# Patient Record
Sex: FEMALE | Race: WHITE | HISPANIC OR LATINO | Employment: PART TIME | URBAN - METROPOLITAN AREA
[De-identification: names, ages, dates, MRNs, and addresses within clinical notes are randomized per-mention and may not be internally consistent; named-entity substitution may affect disease eponyms.]

---

## 2018-07-26 ENCOUNTER — HOSPITAL ENCOUNTER (OUTPATIENT)
Dept: LAB | Facility: MEDICAL CENTER | Age: 63
End: 2018-07-26
Attending: STUDENT IN AN ORGANIZED HEALTH CARE EDUCATION/TRAINING PROGRAM
Payer: COMMERCIAL

## 2018-07-26 ENCOUNTER — OFFICE VISIT (OUTPATIENT)
Dept: INTERNAL MEDICINE | Facility: MEDICAL CENTER | Age: 63
End: 2018-07-26
Payer: COMMERCIAL

## 2018-07-26 VITALS
RESPIRATION RATE: 20 BRPM | SYSTOLIC BLOOD PRESSURE: 124 MMHG | WEIGHT: 167 LBS | HEIGHT: 64 IN | HEART RATE: 89 BPM | OXYGEN SATURATION: 97 % | BODY MASS INDEX: 28.51 KG/M2 | TEMPERATURE: 97.5 F | DIASTOLIC BLOOD PRESSURE: 72 MMHG

## 2018-07-26 DIAGNOSIS — Z00.00 PREVENTATIVE HEALTH CARE: ICD-10-CM

## 2018-07-26 DIAGNOSIS — Z23 ENCOUNTER FOR VACCINATION: ICD-10-CM

## 2018-07-26 DIAGNOSIS — E03.9 ACQUIRED HYPOTHYROIDISM: ICD-10-CM

## 2018-07-26 LAB
CHOLEST SERPL-MCNC: 206 MG/DL (ref 100–199)
ERYTHROCYTE [DISTWIDTH] IN BLOOD BY AUTOMATED COUNT: 46.8 FL (ref 35.9–50)
HCT VFR BLD AUTO: 45.6 % (ref 37–47)
HDLC SERPL-MCNC: 51 MG/DL
HGB BLD-MCNC: 15.4 G/DL (ref 12–16)
LDLC SERPL CALC-MCNC: 131 MG/DL
MCH RBC QN AUTO: 31.2 PG (ref 27–33)
MCHC RBC AUTO-ENTMCNC: 33.8 G/DL (ref 33.6–35)
MCV RBC AUTO: 92.5 FL (ref 81.4–97.8)
PLATELET # BLD AUTO: 184 K/UL (ref 164–446)
PMV BLD AUTO: 11.6 FL (ref 9–12.9)
RBC # BLD AUTO: 4.93 M/UL (ref 4.2–5.4)
TRIGL SERPL-MCNC: 119 MG/DL (ref 0–149)
TSH SERPL DL<=0.005 MIU/L-ACNC: 1.97 UIU/ML (ref 0.38–5.33)
WBC # BLD AUTO: 6.2 K/UL (ref 4.8–10.8)

## 2018-07-26 PROCEDURE — 84443 ASSAY THYROID STIM HORMONE: CPT

## 2018-07-26 PROCEDURE — 85027 COMPLETE CBC AUTOMATED: CPT

## 2018-07-26 PROCEDURE — 36415 COLL VENOUS BLD VENIPUNCTURE: CPT

## 2018-07-26 PROCEDURE — 80061 LIPID PANEL: CPT

## 2018-07-26 PROCEDURE — 99204 OFFICE O/P NEW MOD 45 MIN: CPT | Mod: GC | Performed by: INTERNAL MEDICINE

## 2018-07-26 RX ORDER — LEVOTHYROXINE SODIUM 88 UG/1
88 TABLET ORAL
Qty: 90 TAB | Refills: 3 | Status: SHIPPED | OUTPATIENT
Start: 2018-07-26 | End: 2022-03-09 | Stop reason: SDUPTHER

## 2018-07-26 RX ORDER — LEVOTHYROXINE SODIUM 88 UG/1
88 TABLET ORAL
COMMUNITY
End: 2018-07-26 | Stop reason: SDUPTHER

## 2018-07-26 ASSESSMENT — PATIENT HEALTH QUESTIONNAIRE - PHQ9: CLINICAL INTERPRETATION OF PHQ2 SCORE: 0

## 2018-07-26 ASSESSMENT — PAIN SCALES - GENERAL: PAINLEVEL: NO PAIN

## 2018-07-26 NOTE — LETTER
"[employer]  Gaby Mcdaniels M.D.  [address]  [place] [address]  Fax: [phone]   Authorization for Release/Disclosure of   Protected Health Information   Name: TANIA KIRKLAND : 1955 SSN: [id number]   Address: [address] [place] [address] [place] [address] Phone:    [phone] (home)    I authorize the entity listed below to release/disclose the PHI below to:   [facility]/Gaby Mcdaniels M.D. and Gaby Mcdaniels M.D.   Provider or Entity Name:                                      [facility]   Phone: [phone]      Fax:     Reason for request: continuity of care   Information to be released:    [  ] LAST COLONOSCOPY,  including any PATH REPORT and follow-up  [  ] LAST FIT/COLOGUARD RESULT [  ] LAST DEXA  [  ] LAST MAMMOGRAM  [  ] LAST PAP  [  ] LAST LABS [  ] RETINA EXAM REPORT  [  ] IMMUNIZATION RECORDS  [X] Release all info      [  ] Check here and initial the line next to each item to release ALL health information INCLUDING  _____ Care and treatment for drug and / or alcohol abuse  _____ HIV testing, infection status, or AIDS  _____ Genetic Testing    DATES OF SERVICE OR TIME PERIOD TO BE DISCLOSED: _____________  I understand and acknowledge that:  * This Authorization may be revoked at any time by you in writing, except if your health information has already been used or disclosed.  * Your health information that will be used or disclosed as a result of you signing this authorization could be re-disclosed by the recipient. If this occurs, your re-disclosed health information may no longer be protected by State or Federal laws.  * You may refuse to sign this Authorization. Your refusal will not affect your ability to obtain treatment.  * This Authorization becomes effective upon signing and will  on (date) __________.      If no date is indicated, this Authorization will  one (1) year from the signature date.       Name: Jenn Petersen    Signature:   Date:     7/26/2018       PLEASE FAX REQUESTED RECORDS BACK TO: [phone]

## 2018-07-26 NOTE — PATIENT INSTRUCTIONS
Thank you for seeing me today.    We talked about:    1) Healthcare  - Blood work today    2) Hypothyroidism  - Checking your thyroid hormone today  - I refilled your levothyroxine    See you again in 1 year

## 2018-07-26 NOTE — PROGRESS NOTES
New Patient to Establish    Reason to establish: New patient to establish    CC: Hypothyroidism    HPI: 62yo patient here to establish care.    PMH includes hypothyroidism. She takes 88mcg daily and her last TSH was checked about 9 months ago. No fatigue, hair loss, chest palpitations, constipation.    Finger numbness: She endorses some finger numbness at the tips of her fingers that occurs in the morning for the last year. Attributes this to heavy work building a new deck. No numbness anywhere else in her upper extremities, nonpainful, no cyanosis.    Patient Active Problem List    Diagnosis Date Noted   • Acquired hypothyroidism 07/26/2018   • Preventative health care 07/26/2018       Past Medical History:   Diagnosis Date   • Thyroid disease        Current Outpatient Prescriptions   Medication Sig Dispense Refill   • levothyroxine (SYNTHROID) 88 MCG Tab Take 1 Tab by mouth Every morning on an empty stomach. 90 Tab 3     No current facility-administered medications for this visit.        Allergies as of 07/26/2018   • (Not on File)       Social History     Social History   • Marital status:      Spouse name: N/A   • Number of children: N/A   • Years of education: N/A     Occupational History   • Not on file.     Social History Main Topics   • Smoking status: Never Smoker   • Smokeless tobacco: Never Used   • Alcohol use Yes      Comment: occasionally   • Drug use: No   • Sexual activity: Not on file     Other Topics Concern   • Not on file     Social History Narrative   • No narrative on file       Family History   Problem Relation Age of Onset   • Heart Disease Mother    • Cancer Father    • Hypertension Sister        History reviewed. No pertinent surgical history.    ROS: As per HPI. Additional pertinent symptoms as noted below.  Constitutional:  no fevers/chills, no recent weight loss.   Eyes:  no changes in vision.  ENT:  no hearing loss/no changes in hearing.  No congestion.  No sore  "throat.  Cardiovascular:  no chest pain  No orthopnea, no PND.  Respiratory:  no shortness of breath, no cough, no wheezing.  No sputum production.    GI:  no abdominal pain.  No nausea/vomiting.  No heartburn.  No diarrhea, no constipation.  No blood in stool.  :  no polyuria, no burning on urination.  No hematuria.    MSK:  no myalgias, no back pain, no joint pain.  Neurological: no headache. No dizziness.  No tremors. No changes in sensation.   Psych: no depression, no anxiety.  No SI/HI.     /72   Pulse 89   Temp 36.4 °C (97.5 °F)   Resp 20   Ht 1.626 m (5' 4\")   Wt 75.8 kg (167 lb)   SpO2 97%   Breastfeeding? No   BMI 28.67 kg/m²     Physical Exam  General:  Alert and oriented, No apparent distress.  Eyes: Pupils equal and reactive. No scleral icterus.  Throat: Clear no erythema or exudates noted.  Neck: Supple. No lymphadenopathy noted. Thyroid not enlarged.  Lungs: Clear to auscultation and percussion bilaterally.  Cardiovascular: Regular rate and rhythm. No murmurs, rubs or gallops  Abdomen:  Benign. No rebound or guarding noted.  Extremities: No clubbing, cyanosis, edema.  Skin: Clear. No rash or suspicious skin lesions noted.      Assessment and Plan    1. Preventative health care  - Mammogram 1 year ago  - Colonoscopy 2 years ago  - Pap smear 2 years ago    2. Acquired hypothyroidism  - TSH  - Refill levothyroxine 88mcg    3. Encounter for vaccination  Shingles vaccine recommended    4. Finger numbness  Likely related to muscle fatigue d/t heavy lifting. Advised her to let me know if the symptoms persist despite stopping the yard work.    Followup: Return in about 1 year (around 7/26/2019).    Signed by: Gaby Mcdaniels M.D.    "

## 2019-07-15 ENCOUNTER — OFFICE VISIT (OUTPATIENT)
Dept: OCCUPATIONAL MEDICINE | Facility: CLINIC | Age: 64
End: 2019-07-15

## 2019-07-15 ENCOUNTER — NON-PROVIDER VISIT (OUTPATIENT)
Dept: OCCUPATIONAL MEDICINE | Facility: CLINIC | Age: 64
End: 2019-07-15

## 2019-07-15 ENCOUNTER — HOSPITAL ENCOUNTER (OUTPATIENT)
Facility: MEDICAL CENTER | Age: 64
End: 2019-07-15
Attending: PREVENTIVE MEDICINE
Payer: COMMERCIAL

## 2019-07-15 VITALS
OXYGEN SATURATION: 98 % | WEIGHT: 174 LBS | HEART RATE: 96 BPM | DIASTOLIC BLOOD PRESSURE: 80 MMHG | SYSTOLIC BLOOD PRESSURE: 138 MMHG | TEMPERATURE: 98.2 F | BODY MASS INDEX: 29.71 KG/M2 | HEIGHT: 64 IN

## 2019-07-15 DIAGNOSIS — Z02.1 PRE-EMPLOYMENT HEALTH SCREENING EXAMINATION: ICD-10-CM

## 2019-07-15 DIAGNOSIS — Z02.1 PRE-EMPLOYMENT DRUG SCREENING: ICD-10-CM

## 2019-07-15 LAB
AMP AMPHETAMINE: NORMAL
COC COCAINE: NORMAL
INT CON NEG: NORMAL
INT CON POS: NORMAL
MET METHAMPHETAMINES: NORMAL
OPI OPIATES: NORMAL
PCP PHENCYCLIDINE: NORMAL
POC DRUG COMMENT 753798-OCCUPATIONAL HEALTH: NORMAL
THC: NORMAL

## 2019-07-15 PROCEDURE — 8915 PR COMPREHENSIVE PHYSICAL: Performed by: PREVENTIVE MEDICINE

## 2019-07-15 PROCEDURE — 80305 DRUG TEST PRSMV DIR OPT OBS: CPT | Performed by: PREVENTIVE MEDICINE

## 2019-07-15 PROCEDURE — 86480 TB TEST CELL IMMUN MEASURE: CPT | Performed by: PREVENTIVE MEDICINE

## 2019-07-17 LAB
GAMMA INTERFERON BACKGROUND BLD IA-ACNC: 0.06 IU/ML
M TB IFN-G BLD-IMP: NEGATIVE
M TB IFN-G CD4+ BCKGRND COR BLD-ACNC: -0.02 IU/ML
MITOGEN IGNF BCKGRD COR BLD-ACNC: >10 IU/ML
QFT TB2 - NIL TBQ2: -0.01 IU/ML

## 2020-02-19 ENCOUNTER — HOSPITAL ENCOUNTER (OUTPATIENT)
Dept: RADIOLOGY | Facility: MEDICAL CENTER | Age: 65
End: 2020-02-19
Attending: STUDENT IN AN ORGANIZED HEALTH CARE EDUCATION/TRAINING PROGRAM
Payer: COMMERCIAL

## 2020-02-19 DIAGNOSIS — Z12.31 OTHER SCREENING MAMMOGRAM: ICD-10-CM

## 2020-02-19 PROCEDURE — 77067 SCR MAMMO BI INCL CAD: CPT

## 2020-02-25 ENCOUNTER — HOSPITAL ENCOUNTER (OUTPATIENT)
Dept: RADIOLOGY | Facility: MEDICAL CENTER | Age: 65
End: 2020-02-25
Payer: COMMERCIAL

## 2021-03-03 DIAGNOSIS — Z23 NEED FOR VACCINATION: ICD-10-CM

## 2022-03-09 ENCOUNTER — OFFICE VISIT (OUTPATIENT)
Dept: INTERNAL MEDICINE | Facility: OTHER | Age: 67
End: 2022-03-09
Payer: MEDICARE

## 2022-03-09 VITALS
DIASTOLIC BLOOD PRESSURE: 78 MMHG | HEIGHT: 63 IN | BODY MASS INDEX: 29.7 KG/M2 | OXYGEN SATURATION: 99 % | WEIGHT: 167.6 LBS | SYSTOLIC BLOOD PRESSURE: 146 MMHG | TEMPERATURE: 97.2 F | HEART RATE: 60 BPM

## 2022-03-09 DIAGNOSIS — Z12.12 SCREENING FOR COLORECTAL CANCER: ICD-10-CM

## 2022-03-09 DIAGNOSIS — E03.9 ACQUIRED HYPOTHYROIDISM: ICD-10-CM

## 2022-03-09 DIAGNOSIS — Z00.00 MEDICARE ANNUAL WELLNESS VISIT, INITIAL: Primary | ICD-10-CM

## 2022-03-09 DIAGNOSIS — Z13.820 ENCOUNTER FOR OSTEOPOROSIS SCREENING IN ASYMPTOMATIC POSTMENOPAUSAL PATIENT: ICD-10-CM

## 2022-03-09 DIAGNOSIS — Z78.0 ENCOUNTER FOR OSTEOPOROSIS SCREENING IN ASYMPTOMATIC POSTMENOPAUSAL PATIENT: ICD-10-CM

## 2022-03-09 DIAGNOSIS — Z91.81 RISK FOR FALLS: ICD-10-CM

## 2022-03-09 DIAGNOSIS — Z12.11 SCREENING FOR COLORECTAL CANCER: ICD-10-CM

## 2022-03-09 DIAGNOSIS — E78.5 HYPERLIPIDEMIA, UNSPECIFIED HYPERLIPIDEMIA TYPE: ICD-10-CM

## 2022-03-09 DIAGNOSIS — Z12.31 ENCOUNTER FOR SCREENING MAMMOGRAM FOR BREAST CANCER: ICD-10-CM

## 2022-03-09 PROCEDURE — G0439 PPPS, SUBSEQ VISIT: HCPCS | Mod: GE

## 2022-03-09 RX ORDER — LEVOTHYROXINE SODIUM 88 UG/1
88 TABLET ORAL
Qty: 90 TABLET | Refills: 3 | Status: SHIPPED | OUTPATIENT
Start: 2022-03-09 | End: 2023-03-21

## 2022-03-09 SDOH — HEALTH STABILITY: MENTAL HEALTH
STRESS IS WHEN SOMEONE FEELS TENSE, NERVOUS, ANXIOUS, OR CAN'T SLEEP AT NIGHT BECAUSE THEIR MIND IS TROUBLED. HOW STRESSED ARE YOU?: ONLY A LITTLE

## 2022-03-09 SDOH — ECONOMIC STABILITY: HOUSING INSECURITY
IN THE LAST 12 MONTHS, WAS THERE A TIME WHEN YOU DID NOT HAVE A STEADY PLACE TO SLEEP OR SLEPT IN A SHELTER (INCLUDING NOW)?: NO

## 2022-03-09 SDOH — ECONOMIC STABILITY: TRANSPORTATION INSECURITY
IN THE PAST 12 MONTHS, HAS THE LACK OF TRANSPORTATION KEPT YOU FROM MEDICAL APPOINTMENTS OR FROM GETTING MEDICATIONS?: NO

## 2022-03-09 SDOH — ECONOMIC STABILITY: FOOD INSECURITY: WITHIN THE PAST 12 MONTHS, YOU WORRIED THAT YOUR FOOD WOULD RUN OUT BEFORE YOU GOT MONEY TO BUY MORE.: NEVER TRUE

## 2022-03-09 SDOH — ECONOMIC STABILITY: HOUSING INSECURITY: IN THE LAST 12 MONTHS, HOW MANY PLACES HAVE YOU LIVED?: 1

## 2022-03-09 SDOH — ECONOMIC STABILITY: FOOD INSECURITY: WITHIN THE PAST 12 MONTHS, THE FOOD YOU BOUGHT JUST DIDN'T LAST AND YOU DIDN'T HAVE MONEY TO GET MORE.: NEVER TRUE

## 2022-03-09 SDOH — ECONOMIC STABILITY: TRANSPORTATION INSECURITY
IN THE PAST 12 MONTHS, HAS LACK OF TRANSPORTATION KEPT YOU FROM MEETINGS, WORK, OR FROM GETTING THINGS NEEDED FOR DAILY LIVING?: NO

## 2022-03-09 SDOH — ECONOMIC STABILITY: INCOME INSECURITY: HOW HARD IS IT FOR YOU TO PAY FOR THE VERY BASICS LIKE FOOD, HOUSING, MEDICAL CARE, AND HEATING?: NOT VERY HARD

## 2022-03-09 SDOH — ECONOMIC STABILITY: INCOME INSECURITY: IN THE LAST 12 MONTHS, WAS THERE A TIME WHEN YOU WERE NOT ABLE TO PAY THE MORTGAGE OR RENT ON TIME?: NO

## 2022-03-09 SDOH — HEALTH STABILITY: PHYSICAL HEALTH: ON AVERAGE, HOW MANY MINUTES DO YOU ENGAGE IN EXERCISE AT THIS LEVEL?: 20 MIN

## 2022-03-09 SDOH — ECONOMIC STABILITY: TRANSPORTATION INSECURITY
IN THE PAST 12 MONTHS, HAS LACK OF RELIABLE TRANSPORTATION KEPT YOU FROM MEDICAL APPOINTMENTS, MEETINGS, WORK OR FROM GETTING THINGS NEEDED FOR DAILY LIVING?: NO

## 2022-03-09 SDOH — HEALTH STABILITY: PHYSICAL HEALTH: ON AVERAGE, HOW MANY DAYS PER WEEK DO YOU ENGAGE IN MODERATE TO STRENUOUS EXERCISE (LIKE A BRISK WALK)?: 7 DAYS

## 2022-03-09 ASSESSMENT — ACTIVITIES OF DAILY LIVING (ADL): BATHING_REQUIRES_ASSISTANCE: 0

## 2022-03-09 ASSESSMENT — SOCIAL DETERMINANTS OF HEALTH (SDOH)
HOW HARD IS IT FOR YOU TO PAY FOR THE VERY BASICS LIKE FOOD, HOUSING, MEDICAL CARE, AND HEATING?: NOT VERY HARD
HOW OFTEN DO YOU ATTENT MEETINGS OF THE CLUB OR ORGANIZATION YOU BELONG TO?: NEVER
IN A TYPICAL WEEK, HOW MANY TIMES DO YOU TALK ON THE PHONE WITH FAMILY, FRIENDS, OR NEIGHBORS?: MORE THAN THREE TIMES A WEEK
WITHIN THE PAST 12 MONTHS, YOU WORRIED THAT YOUR FOOD WOULD RUN OUT BEFORE YOU GOT THE MONEY TO BUY MORE: NEVER TRUE
HOW OFTEN DO YOU ATTEND CHURCH OR RELIGIOUS SERVICES?: NEVER
HOW OFTEN DO YOU GET TOGETHER WITH FRIENDS OR RELATIVES?: PATIENT DECLINED
HOW MANY DRINKS CONTAINING ALCOHOL DO YOU HAVE ON A TYPICAL DAY WHEN YOU ARE DRINKING: 1 OR 2
HOW OFTEN DO YOU ATTENT MEETINGS OF THE CLUB OR ORGANIZATION YOU BELONG TO?: NEVER
HOW OFTEN DO YOU GET TOGETHER WITH FRIENDS OR RELATIVES?: PATIENT DECLINED
DO YOU BELONG TO ANY CLUBS OR ORGANIZATIONS SUCH AS CHURCH GROUPS UNIONS, FRATERNAL OR ATHLETIC GROUPS, OR SCHOOL GROUPS?: NO
IN A TYPICAL WEEK, HOW MANY TIMES DO YOU TALK ON THE PHONE WITH FAMILY, FRIENDS, OR NEIGHBORS?: MORE THAN THREE TIMES A WEEK
HOW OFTEN DO YOU ATTEND CHURCH OR RELIGIOUS SERVICES?: NEVER
DO YOU BELONG TO ANY CLUBS OR ORGANIZATIONS SUCH AS CHURCH GROUPS UNIONS, FRATERNAL OR ATHLETIC GROUPS, OR SCHOOL GROUPS?: NO
HOW OFTEN DO YOU HAVE A DRINK CONTAINING ALCOHOL: 2-4 TIMES A MONTH
HOW OFTEN DO YOU HAVE SIX OR MORE DRINKS ON ONE OCCASION: NEVER

## 2022-03-09 ASSESSMENT — LIFESTYLE VARIABLES
HOW OFTEN DO YOU HAVE SIX OR MORE DRINKS ON ONE OCCASION: NEVER
HOW OFTEN DO YOU HAVE A DRINK CONTAINING ALCOHOL: 2-4 TIMES A MONTH
HOW MANY STANDARD DRINKS CONTAINING ALCOHOL DO YOU HAVE ON A TYPICAL DAY: 1 OR 2

## 2022-03-09 ASSESSMENT — ENCOUNTER SYMPTOMS: GENERAL WELL-BEING: GOOD

## 2022-03-09 ASSESSMENT — PATIENT HEALTH QUESTIONNAIRE - PHQ9: CLINICAL INTERPRETATION OF PHQ2 SCORE: 0

## 2022-03-09 NOTE — PATIENT INSTRUCTIONS
It was great seeing you today Jenn!    Today we saw you for your Medicare wellness visit.    It seems like overall things are going very well for you.    We addressed many healthcare maintenance items and have placed the appropriate referrals and orders for these.    For your mammogram and bone density scan you will need to call [facility] imaging or other imaging service of your choice to set up a schedule to have these done.    For your colonoscopy, referral to a GI doctor has been sent.  You should receive notification that the referral has been processed within 2 weeks, if you have not heard from a GI doctor or not received a message in my chart within this time please reach out to our office and we will make sure the referral has been processed.    For your labs you can have these done at your convenience at [facility] as we discussed. You will just need to call and schedule.    We also discussed your immunizations.  You are due for the following:    Tdap  Shingles  Pneumovax    You can go to the pharmacy of your choice to have these done.    If everything comes back nonconcerning we will plan to follow-up in 1 year for another wellness visit.    Otherwise we will notify you of any abnormal results and will schedule follow-up as needed.

## 2022-03-09 NOTE — PROGRESS NOTES
Chief Complaint   Patient presents with   • Medicare Annual Wellness     Yearly physical medicare         HPI:  Jenn is a 67 y.o. here for Medicare Annual Wellness Visit    She reports that she is generally healthy and has no acute concerns.       Patient Active Problem List    Diagnosis Date Noted   • Risk for falls 03/09/2022   • Medicare annual wellness visit, initial 07/26/2018   • History of colonic polyps 03/11/2014   • Hyperlipidemia 12/15/2011   • Hypothyroidism 04/09/2009       Current Outpatient Medications   Medication Sig Dispense Refill   • levothyroxine (SYNTHROID) 88 MCG Tab Take 1 Tablet by mouth every morning on an empty stomach. 90 Tablet 3     No current facility-administered medications for this visit.        Patient is taking medications as noted in medication list.  Current supplements as per medication list.     Allergies: Patient has no known allergies.    Current social contact/activities: volunteer food bank-wood work    Is patient current with immunizations? Yes.    She  reports that she has never smoked. She has never used smokeless tobacco. She reports current alcohol use. She reports that she does not use drugs.  Counseling given: Not Answered        DPA/Advanced directive: Patient has Living Will, but it is not on file. Instructed to bring in a copy to scan into their chart.    ROS:    Gait: Uses no assistive device   Ostomy: No   Other tubes: No   Amputations: No   Chronic oxygen use No   Last eye exam 08/12/2021   Wears hearing aids: No   : Denies any urinary leakage during the last 6 months      Screening:        Depression Screening  Little interest or pleasure in doing things?  0 - not at all  Feeling down, depressed, or hopeless? 0 - not at all  Patient Health Questionnaire Score: 0    If depressive symptoms identified deferred to follow up visit unless specifically addressed in assessment and plan.    Interpretation of PHQ-9 Total Score   Score Severity   1-4 No  Depression   5-9 Mild Depression   10-14 Moderate Depression   15-19 Moderately Severe Depression   20-27 Severe Depression    Screening for Cognitive Impairment  Three Minute Recall (daughter, heaven, mountain)  3/3    Jose Martin clock face with all 12 numbers and set the hands to show 10 past 11.  Yes    If cognitive concerns identified, deferred for follow up unless specifically addressed in assessment and plan.    Fall Risk Assessment  Has the patient had two or more falls in the last year or any fall with injury in the last year?  Yes- Only had one fall slipping on ice.  If fall risk identified, deferred for follow up unless specifically addressed in assessment and plan.    Safety Assessment  Throw rugs on floor.  Yes  Handrails on all stairs.  No  Good lighting in all hallways.  Yes  Difficulty hearing.  No  Patient counseled about all safety risks that were identified.    Functional Assessment ADLs  Are there any barriers preventing you from cooking for yourself or meeting nutritional needs?  No.    Are there any barriers preventing you from driving safely or obtaining transportation?  No.    Are there any barriers preventing you from using a telephone or calling for help?  No.    Are there any barriers preventing you from shopping?  No.    Are there any barriers preventing you from taking care of your own finances?  No.    Are there any barriers preventing you from managing your medications?  No.    Are there any barriers preventing you from showering, bathing or dressing yourself?  No.    Are you currently engaging in any exercise or physical activity?  No.     What is your perception of your health?  Good.    Health Maintenance Summary          Overdue - HEPATITIS C SCREENING (Once) Overdue - never done    No completion history exists for this topic.          Overdue - PAP SMEAR (Every 3 Years) Overdue - never done    No completion history exists for this topic.          Ordered - COLORECTAL CANCER SCREENING  (COLONOSCOPY - Every 10 Years) Ordered on 3/9/2022    No completion history exists for this topic.          Overdue - IMM ZOSTER VACCINES (1 of 2) Overdue - never done    No completion history exists for this topic.          Overdue - IMM DTaP/Tdap/Td Vaccine (2 - Td or Tdap) Overdue since 10/15/2020    10/15/2010  Imm Admin: Tdap Vaccine    05/10/2004  Imm Admin: TD Vaccine    01/01/1992  Imm Admin: TD Vaccine          Ordered - BONE DENSITY (Every 5 Years) Ordered on 3/9/2022    No completion history exists for this topic.          Overdue - IMM PNEUMOCOCCAL VACCINE: 65+ Years (1 of 1 - PPSV23) Overdue - never done    No completion history exists for this topic.          Ordered - MAMMOGRAM (Yearly) Ordered on 3/9/2022    02/19/2020  MA-SCREENING MAMMO BILAT W/TOMOSYNTHESIS W/CAD          IMM INFLUENZA (Series Information) Completed    10/09/2021  Imm Admin: Influenza Vaccine Quad Inj (Pf)    01/25/2016  Imm Admin: Influenza Vaccine Quad Inj (Pf)    10/17/2013  Imm Admin: Influenza, Unspecified - HISTORICAL DATA    09/20/2011  Imm Admin: Influenza Seasonal Injectable - Historical Data          COVID-19 Vaccine (Series Information) Completed    11/22/2021  Imm Admin: Moderna SARS-CoV-2 Vaccine    04/06/2021  Imm Admin: Moderna SARS-CoV-2 Vaccine    03/09/2021  Imm Admin: Moderna SARS-CoV-2 Vaccine          IMM HEP B VACCINE (Series Information) Aged Out    No completion history exists for this topic.          IMM MENINGOCOCCAL VACCINE (MCV4) (Series Information) Aged Out    No completion history exists for this topic.                Patient Care Team:  Allie Fowler M.D. as PCP - General (Internal Medicine)    Social History     Tobacco Use   • Smoking status: Never Smoker   • Smokeless tobacco: Never Used   Substance Use Topics   • Alcohol use: Yes     Comment: occasionally   • Drug use: No     Family History   Problem Relation Age of Onset   • Heart Disease Mother    • Cancer Father    • Hypertension Sister   "  • Breast Cancer Maternal Aunt      She  has a past medical history of Thyroid disease.   No past surgical history on file.        Exam:     /78 (BP Location: Left arm, Patient Position: Sitting, BP Cuff Size: Large adult long)   Pulse 60   Temp 36.2 °C (97.2 °F) (Temporal)   Ht 1.595 m (5' 2.8\")   Wt 76 kg (167 lb 9.6 oz)   SpO2 99%  Body mass index is 29.88 kg/m².    Hearing good.    Dentition good  Alert, oriented in no acute distress.  Eye contact is good, speech goal directed, affect calm      Assessment and Plan. The following treatment and monitoring plan is recommended:    1. Medicare annual wellness visit, initial     2. Acquired hypothyroidism  TSH+FREE T4    Comp Metabolic Panel    levothyroxine (SYNTHROID) 88 MCG Tab   3. Risk for falls  Patient identified as fall risk.  Appropriate orders and counseling given.   4. Encounter for screening mammogram for breast cancer  MA-SCREENING MAMMO BILAT W/TOMOSYNTHESIS W/CAD   5. Screening for colorectal cancer  Referral to GI for Colonoscopy    CBC WITH DIFFERENTIAL   6. Encounter for osteoporosis screening in asymptomatic postmenopausal patient  VITAMIN D,25 HYDROXY    DS-BONE DENSITY STUDY (DEXA)   7. Hyperlipidemia, unspecified hyperlipidemia type  Lipid Profile         Services suggested: No services needed at this time  Health Care Screening recommendations as per orders if indicated.  Referrals offered: PT/OT/Nutrition counseling/Behavioral Health/Smoking cessation as per orders if indicated.    Discussion today about general wellness and lifestyle habits:    · Prevent falls and reduce trip hazards; Cautioned about securing or removing rugs.  · Have a working fire alarm and carbon monoxide detector;   · Engage in regular physical activity and social activities       Follow-up: No follow-ups on file.  "

## 2022-03-28 ENCOUNTER — HOSPITAL ENCOUNTER (OUTPATIENT)
Dept: RADIOLOGY | Facility: MEDICAL CENTER | Age: 67
End: 2022-03-28
Payer: MEDICARE

## 2022-03-28 DIAGNOSIS — Z78.0 ENCOUNTER FOR OSTEOPOROSIS SCREENING IN ASYMPTOMATIC POSTMENOPAUSAL PATIENT: ICD-10-CM

## 2022-03-28 DIAGNOSIS — Z13.820 ENCOUNTER FOR OSTEOPOROSIS SCREENING IN ASYMPTOMATIC POSTMENOPAUSAL PATIENT: ICD-10-CM

## 2022-03-28 DIAGNOSIS — Z12.31 ENCOUNTER FOR SCREENING MAMMOGRAM FOR BREAST CANCER: ICD-10-CM

## 2022-03-28 PROCEDURE — 77063 BREAST TOMOSYNTHESIS BI: CPT

## 2022-03-28 PROCEDURE — 77080 DXA BONE DENSITY AXIAL: CPT

## 2022-11-07 ENCOUNTER — PATIENT MESSAGE (OUTPATIENT)
Dept: HEALTH INFORMATION MANAGEMENT | Facility: OTHER | Age: 67
End: 2022-11-07

## 2023-03-19 DIAGNOSIS — E03.9 ACQUIRED HYPOTHYROIDISM: ICD-10-CM

## 2023-03-21 RX ORDER — LEVOTHYROXINE SODIUM 88 UG/1
TABLET ORAL
Qty: 90 TABLET | Refills: 0 | Status: SHIPPED | OUTPATIENT
Start: 2023-03-21 | End: 2023-07-10 | Stop reason: SDUPTHER

## 2023-07-06 DIAGNOSIS — E03.9 ACQUIRED HYPOTHYROIDISM: ICD-10-CM

## 2023-07-10 ENCOUNTER — OFFICE VISIT (OUTPATIENT)
Dept: INTERNAL MEDICINE | Facility: OTHER | Age: 68
End: 2023-07-10
Payer: MEDICARE

## 2023-07-10 VITALS
TEMPERATURE: 97.8 F | DIASTOLIC BLOOD PRESSURE: 80 MMHG | OXYGEN SATURATION: 96 % | HEART RATE: 62 BPM | WEIGHT: 170.8 LBS | SYSTOLIC BLOOD PRESSURE: 125 MMHG | BODY MASS INDEX: 30.26 KG/M2 | HEIGHT: 63 IN

## 2023-07-10 DIAGNOSIS — E03.9 ACQUIRED HYPOTHYROIDISM: ICD-10-CM

## 2023-07-10 DIAGNOSIS — E66.9 OBESITY (BMI 30-39.9): ICD-10-CM

## 2023-07-10 DIAGNOSIS — E78.5 HYPERLIPIDEMIA, UNSPECIFIED HYPERLIPIDEMIA TYPE: ICD-10-CM

## 2023-07-10 DIAGNOSIS — Z00.00 ENCOUNTER FOR PREVENTIVE CARE: ICD-10-CM

## 2023-07-10 PROCEDURE — 3074F SYST BP LT 130 MM HG: CPT

## 2023-07-10 PROCEDURE — 99214 OFFICE O/P EST MOD 30 MIN: CPT | Mod: GC

## 2023-07-10 PROCEDURE — 3079F DIAST BP 80-89 MM HG: CPT

## 2023-07-10 RX ORDER — LEVOTHYROXINE SODIUM 88 UG/1
88 TABLET ORAL
Qty: 30 TABLET | Refills: 1 | Status: SHIPPED | OUTPATIENT
Start: 2023-07-10 | End: 2023-09-07 | Stop reason: SDUPTHER

## 2023-07-10 ASSESSMENT — ENCOUNTER SYMPTOMS
RESPIRATORY NEGATIVE: 1
MUSCULOSKELETAL NEGATIVE: 1
CONSTITUTIONAL NEGATIVE: 1
PALPITATIONS: 0
NEUROLOGICAL NEGATIVE: 1
CARDIOVASCULAR NEGATIVE: 1
PSYCHIATRIC NEGATIVE: 1
GASTROINTESTINAL NEGATIVE: 1

## 2023-07-10 ASSESSMENT — PATIENT HEALTH QUESTIONNAIRE - PHQ9: CLINICAL INTERPRETATION OF PHQ2 SCORE: 0

## 2023-07-10 NOTE — PATIENT INSTRUCTIONS
Please do blood work in fasting, please schedule follow up in one month with Dr. Fowler  We refilled your thyroid medication

## 2023-07-10 NOTE — PROGRESS NOTES
"Subjective     Jenn Petersen is a 68 y.o. female who presents with Follow-Up and Medication Refill (levothyroxine)            Patient comes in with no acute complaints and concerns other than refill of medications.  Patient states that she has been compliant on Levothyroxine 88 mcg for years , with no recent dose changes.  Reviewed with patient and most recent TSH on file was 2018.  Aymptomatic, no hair or skin changes    Blood pressure elevation-This was not one of patient's concern during visit however was also addressed.  This visit, patient's blood pressure at first was noted to be 162/94, repeat blood pressure 125/80 . States that she normally does not have that high of blood pressure, states that she had only minimal amount of sleep last night.She is a non smoker, non alcoholic beverage drinker.  Patient is asymptomatic, denies headache, dizziness, denies visual changes.  Blood pressure during previous visit on 2022 was noted to be 144/78.           Review of Systems   Constitutional: Negative.    HENT: Negative.     Respiratory: Negative.     Cardiovascular: Negative.  Negative for palpitations.   Gastrointestinal: Negative.    Genitourinary: Negative.    Musculoskeletal: Negative.    Skin: Negative.    Neurological: Negative.    Endo/Heme/Allergies: Negative.    Psychiatric/Behavioral: Negative.                Objective     /80   Pulse 62   Temp 36.6 °C (97.8 °F) (Temporal)   Ht 1.6 m (5' 3\")   Wt 77.5 kg (170 lb 12.8 oz)   SpO2 96%   BMI 30.26 kg/m²      Physical Exam  Constitutional:       Appearance: Normal appearance.   HENT:      Head: Normocephalic and atraumatic.      Nose: Nose normal.      Mouth/Throat:      Mouth: Mucous membranes are moist.   Eyes:      Extraocular Movements: Extraocular movements intact.   Cardiovascular:      Rate and Rhythm: Normal rate and regular rhythm.   Pulmonary:      Effort: Pulmonary effort is normal.   Abdominal:      Palpations: Abdomen is soft. "   Musculoskeletal:         General: Normal range of motion.      Cervical back: Normal range of motion.   Skin:     General: Skin is warm.      Capillary Refill: Capillary refill takes less than 2 seconds.   Neurological:      General: No focal deficit present.      Mental Status: She is oriented to person, place, and time.   Psychiatric:         Mood and Affect: Mood normal.                             Assessment & Plan            Blood pressure elevation, with no established hypertension  -Advised patient to continue checking her blood pressure at home and bring BP log next visit with her PCP Dr. Fowler  -Advised to continue lifestyle modifications  -CMP, lipid panel pending      2. Acquired hypothyroidism  -Continue with current dose levothyroxine (SYNTHROID) 88 MCG Tab for now, pending repeat TSH WITH REFLEX TO FT4  -prescription refilled    3 . Obesity (BMI 30-39.9)  -Advised lifestyle modifications, regular healthy diet and regular exercise as above  -Pending lipid panel, a1c    4. history of diabetes in the family( mother)  -hemoglobin A1c ordered    5.Encounter for preventive care  - HEP C VIRUS ANTIBODY; Future  - Referral to GI for Colonoscopy  -For more focused preventive care discussion during patient's next visit with PCP            #Follow-up in 1 month with Dr. Fowler

## 2023-07-11 RX ORDER — LEVOTHYROXINE SODIUM 88 UG/1
TABLET ORAL
Qty: 90 TABLET | Refills: 0 | OUTPATIENT
Start: 2023-07-11

## 2023-08-11 ENCOUNTER — TELEPHONE (OUTPATIENT)
Dept: INTERNAL MEDICINE | Facility: OTHER | Age: 68
End: 2023-08-11
Payer: MEDICARE

## 2023-08-11 NOTE — TELEPHONE ENCOUNTER
VOICEMAIL  1. Caller Name: Jenn Petersen  Call Back Number: [phone]    2. Message: Patient wants to schedule appt with Dr. Fowler for august 30 between 10-12 am. Please call patient to schedule.     3. Patient approves office to leave a detailed voicemail/MyChart message: N\A

## 2023-08-14 NOTE — TELEPHONE ENCOUNTER
Patient has an appt to re-estab care at another [facility] location.  Called and asked patient if she still wants to book with Dr Montero?

## 2023-09-01 ENCOUNTER — HOSPITAL ENCOUNTER (OUTPATIENT)
Dept: LAB | Facility: MEDICAL CENTER | Age: 68
End: 2023-09-01
Payer: MEDICARE

## 2023-09-01 DIAGNOSIS — E03.9 ACQUIRED HYPOTHYROIDISM: ICD-10-CM

## 2023-09-01 DIAGNOSIS — Z00.00 ENCOUNTER FOR PREVENTIVE CARE: ICD-10-CM

## 2023-09-01 DIAGNOSIS — E78.5 HYPERLIPIDEMIA, UNSPECIFIED HYPERLIPIDEMIA TYPE: ICD-10-CM

## 2023-09-01 DIAGNOSIS — E66.9 OBESITY (BMI 30-39.9): ICD-10-CM

## 2023-09-01 LAB
ALBUMIN SERPL BCP-MCNC: 4.3 G/DL (ref 3.2–4.9)
ALBUMIN/GLOB SERPL: 1.3 G/DL
ALP SERPL-CCNC: 98 U/L (ref 30–99)
ALT SERPL-CCNC: 25 U/L (ref 2–50)
ANION GAP SERPL CALC-SCNC: 10 MMOL/L (ref 7–16)
AST SERPL-CCNC: 27 U/L (ref 12–45)
BILIRUB SERPL-MCNC: 0.5 MG/DL (ref 0.1–1.5)
BUN SERPL-MCNC: 12 MG/DL (ref 8–22)
CALCIUM ALBUM COR SERPL-MCNC: 9.4 MG/DL (ref 8.5–10.5)
CALCIUM SERPL-MCNC: 9.6 MG/DL (ref 8.5–10.5)
CHLORIDE SERPL-SCNC: 103 MMOL/L (ref 96–112)
CHOLEST SERPL-MCNC: 249 MG/DL (ref 100–199)
CO2 SERPL-SCNC: 24 MMOL/L (ref 20–33)
CREAT SERPL-MCNC: 0.76 MG/DL (ref 0.5–1.4)
EST. AVERAGE GLUCOSE BLD GHB EST-MCNC: 120 MG/DL
FASTING STATUS PATIENT QL REPORTED: NORMAL
GFR SERPLBLD CREATININE-BSD FMLA CKD-EPI: 86 ML/MIN/1.73 M 2
GLOBULIN SER CALC-MCNC: 3.3 G/DL (ref 1.9–3.5)
GLUCOSE SERPL-MCNC: 89 MG/DL (ref 65–99)
HBA1C MFR BLD: 5.8 % (ref 4–5.6)
HCV AB SER QL: NORMAL
HDLC SERPL-MCNC: 53 MG/DL
LDLC SERPL CALC-MCNC: 173 MG/DL
POTASSIUM SERPL-SCNC: 4.4 MMOL/L (ref 3.6–5.5)
PROT SERPL-MCNC: 7.6 G/DL (ref 6–8.2)
SODIUM SERPL-SCNC: 137 MMOL/L (ref 135–145)
TRIGL SERPL-MCNC: 117 MG/DL (ref 0–149)
TSH SERPL DL<=0.005 MIU/L-ACNC: 2.13 UIU/ML (ref 0.38–5.33)

## 2023-09-01 PROCEDURE — 80053 COMPREHEN METABOLIC PANEL: CPT

## 2023-09-01 PROCEDURE — 86803 HEPATITIS C AB TEST: CPT

## 2023-09-01 PROCEDURE — 84443 ASSAY THYROID STIM HORMONE: CPT

## 2023-09-01 PROCEDURE — 80061 LIPID PANEL: CPT

## 2023-09-01 PROCEDURE — 36415 COLL VENOUS BLD VENIPUNCTURE: CPT

## 2023-09-01 PROCEDURE — 83036 HEMOGLOBIN GLYCOSYLATED A1C: CPT | Mod: GA

## 2023-09-07 ENCOUNTER — OFFICE VISIT (OUTPATIENT)
Dept: MEDICAL GROUP | Facility: PHYSICIAN GROUP | Age: 68
End: 2023-09-07
Payer: MEDICARE

## 2023-09-07 VITALS
RESPIRATION RATE: 16 BRPM | HEART RATE: 60 BPM | DIASTOLIC BLOOD PRESSURE: 84 MMHG | SYSTOLIC BLOOD PRESSURE: 142 MMHG | HEIGHT: 63 IN | OXYGEN SATURATION: 97 % | BODY MASS INDEX: 29.95 KG/M2 | TEMPERATURE: 97.4 F | WEIGHT: 169 LBS

## 2023-09-07 DIAGNOSIS — Z23 NEED FOR VACCINATION: ICD-10-CM

## 2023-09-07 DIAGNOSIS — R73.03 PREDIABETES: ICD-10-CM

## 2023-09-07 DIAGNOSIS — Z13.0 SCREENING FOR DEFICIENCY ANEMIA: ICD-10-CM

## 2023-09-07 DIAGNOSIS — Z76.89 ENCOUNTER TO ESTABLISH CARE: ICD-10-CM

## 2023-09-07 DIAGNOSIS — E03.9 HYPOTHYROIDISM, UNSPECIFIED TYPE: ICD-10-CM

## 2023-09-07 DIAGNOSIS — E03.9 ACQUIRED HYPOTHYROIDISM: ICD-10-CM

## 2023-09-07 DIAGNOSIS — E78.00 PURE HYPERCHOLESTEROLEMIA: ICD-10-CM

## 2023-09-07 DIAGNOSIS — R73.09 ELEVATED RANDOM BLOOD GLUCOSE LEVEL: ICD-10-CM

## 2023-09-07 PROBLEM — Z91.81 RISK FOR FALLS: Status: RESOLVED | Noted: 2022-03-09 | Resolved: 2023-09-07

## 2023-09-07 PROCEDURE — 99204 OFFICE O/P NEW MOD 45 MIN: CPT | Mod: 25

## 2023-09-07 PROCEDURE — G0009 ADMIN PNEUMOCOCCAL VACCINE: HCPCS

## 2023-09-07 PROCEDURE — 3079F DIAST BP 80-89 MM HG: CPT

## 2023-09-07 PROCEDURE — 90677 PCV20 VACCINE IM: CPT

## 2023-09-07 PROCEDURE — 3077F SYST BP >= 140 MM HG: CPT

## 2023-09-07 RX ORDER — LEVOTHYROXINE SODIUM 88 UG/1
88 TABLET ORAL
Qty: 100 TABLET | Refills: 3 | Status: SHIPPED | OUTPATIENT
Start: 2023-09-07

## 2023-09-07 RX ORDER — MULTIVIT WITH MINERALS/LUTEIN
TABLET ORAL
COMMUNITY

## 2023-09-07 NOTE — ASSESSMENT & PLAN NOTE
Chronic, unstable. Discussed healthy lifestyle recommendations.   The 10-year ASCVD risk score (Jack SMITH, et al., 2019) is: 9.3%  Plan to recheck 6 months, discussed options for treatment, would like to work on lifestyle before starting cholesterol lowering medications.

## 2023-09-07 NOTE — PATIENT INSTRUCTIONS
"Cholesterol-lowering supplements may be helpful  Diet and exercise are proven ways to reduce cholesterol. Cholesterol-lowering supplements may help, too.  By [facility] Staff  If you're worried about your cholesterol level and have started exercising and eating healthier foods, you might wonder if a dietary supplement could help. With your doctor's OK, here are some cholesterol-improving supplements to consider.  Cholesterol-improving supplement What it might do Side effects and drug interactions   Berberine May reduce low-density lipoprotein (LDL, or \"bad\") cholesterol and triglycerides May cause diarrhea, constipation, gas, nausea or vomiting; may cause harm to babies during pregnancy and breastfeeding   Fish oil May reduce triglycerides May cause a fishy aftertaste, bad breath, gas, nausea, vomiting or diarrhea; may interact with some blood-thinning medications   Flaxseed, ground May reduce LDL cholesterol May cause gas, bloating or diarrhea; may interact with some blood-thinning medications   Garlic May slightly reduce cholesterol but studies have been conflicting May cause bad breath, body odor, nausea, vomiting and gas; may interact with some blood-thinning medications   Green tea or green tea extract May lower LDL cholesterol May cause nausea, vomiting, gas or diarrhea; may interact with blood-thinning medications   Niacin May lower LDL cholesterol and triglycerides; may improve high-density lipoprotein (HDL, or \"good\") cholesterol May cause itching and flushing, which are more common at the higher doses usually needed to have an effect on cholesterol   Plant stanols and sterols May reduce LDL cholesterol, particularly in people with a genetic condition that causes high cholesterol (familial hypercholesterolemia) May cause diarrhea   Red yeast rice -- Natural doesn't mean safe  Some red yeast rice products contain a substance (monacolin K) that is chemically identical to the active ingredient in lovastatin " (Altoprev), a prescription medication that lowers cholesterol. Because there is variability in quality from , the amount of monacolin K can vary widely from product to product.  Products that contain monacolin K can cause the same types of side effects as lovastatin, which include damage to the muscles, kidneys and liver. In the [place], the Food and Drug Administration has ruled that dietary supplements that contain more than trace amounts of monacolin K are unapproved drugs and can't be sold legally as dietary supplements.  Dietary supplements may not be enough  While dietary supplements can help, you might also need prescription medications to get your cholesterol numbers to a safe level. Be sure to tell your doctor if you take any type of dietary supplement, because some can interact with medications you may be taking

## 2023-09-07 NOTE — PROGRESS NOTES
"Subjective:     CC: Diagnoses of Hypothyroidism, unspecified type, Pure hypercholesterolemia, Prediabetes, Encounter to establish care, Need for vaccination, Acquired hypothyroidism, Elevated random blood glucose level, and Screening for deficiency anemia were pertinent to this visit.    Presents today to establish care, requesting refill on thyroid medication.    HPI:   Jenn presents today with    Problem   Prediabetes   Hyperlipidemia   Hypothyroidism   Risk for Falls (Resolved)     ROS:  Review of Systems   All other systems reviewed and are negative.      Objective:     Exam:  BP (!) 142/84 (BP Location: Left arm, Patient Position: Sitting, BP Cuff Size: Adult)   Pulse 60   Temp 36.3 °C (97.4 °F) (Temporal)   Resp 16   Ht 1.6 m (5' 3\")   Wt 76.7 kg (169 lb)   SpO2 97%   BMI 29.94 kg/m²  Body mass index is 29.94 kg/m².    Physical Exam  Vitals reviewed.   Constitutional:       General: She is not in acute distress.     Appearance: Normal appearance. She is well-groomed. She is not ill-appearing.   HENT:      Head: Normocephalic and atraumatic.      Right Ear: Tympanic membrane, ear canal and external ear normal.      Left Ear: Tympanic membrane, ear canal and external ear normal.      Nose: Nose normal.      Mouth/Throat:      Mouth: Mucous membranes are moist.      Pharynx: Oropharynx is clear.   Eyes:      Extraocular Movements: Extraocular movements intact.      Conjunctiva/sclera: Conjunctivae normal.      Pupils: Pupils are equal, round, and reactive to light.   Neck:      Thyroid: No thyromegaly.      Trachea: Trachea normal.   Cardiovascular:      Rate and Rhythm: Normal rate and regular rhythm.      Pulses: Normal pulses.      Heart sounds: Normal heart sounds. No murmur heard.  Pulmonary:      Effort: Pulmonary effort is normal. No respiratory distress.      Breath sounds: Normal breath sounds. No wheezing.   Abdominal:      General: Bowel sounds are normal.   Musculoskeletal:         " General: No swelling, tenderness or deformity. Normal range of motion.      Cervical back: Full passive range of motion without pain.   Lymphadenopathy:      Cervical: No cervical adenopathy.   Skin:     General: Skin is warm and dry.      Capillary Refill: Capillary refill takes less than 2 seconds.   Neurological:      General: No focal deficit present.      Mental Status: She is alert and oriented to person, place, and time. Mental status is at baseline.      Cranial Nerves: No cranial nerve deficit.      Sensory: No sensory deficit.      Motor: No weakness.   Psychiatric:         Mood and Affect: Mood normal.         Behavior: Behavior normal.         Labs:    Latest Reference Range & Units 09/01/23 11:06   Sodium 135 - 145 mmol/L 137   Potassium 3.6 - 5.5 mmol/L 4.4   Chloride 96 - 112 mmol/L 103   Co2 20 - 33 mmol/L 24   Anion Gap 7.0 - 16.0  10.0   Glucose 65 - 99 mg/dL 89   Bun 8 - 22 mg/dL 12   Creatinine 0.50 - 1.40 mg/dL 0.76   GFR (CKD-EPI) >60 mL/min/1.73 m 2 86   Calcium 8.5 - 10.5 mg/dL 9.6   Correct Calcium 8.5 - 10.5 mg/dL 9.4   AST(SGOT) 12 - 45 U/L 27   ALT(SGPT) 2 - 50 U/L 25   Alkaline Phosphatase 30 - 99 U/L 98   Total Bilirubin 0.1 - 1.5 mg/dL 0.5   Albumin 3.2 - 4.9 g/dL 4.3   Total Protein 6.0 - 8.2 g/dL 7.6   Globulin 1.9 - 3.5 g/dL 3.3   A-G Ratio g/dL 1.3   Glycohemoglobin 4.0 - 5.6 % 5.8 (H)   Estim. Avg Glu mg/dL 120   Fasting Status  Fasting   Cholesterol,Tot 100 - 199 mg/dL 249 (H)   Triglycerides 0 - 149 mg/dL 117   HDL >=40 mg/dL 53   LDL <100 mg/dL 173 (H)   TSH 0.380 - 5.330 uIU/mL 2.130   Hepatitis C Antibody Non-Reactive  Non-Reactive   (H): Data is abnormally high    Assessment & Plan:     68 y.o. female with the following -     Problem List Items Addressed This Visit       Hypothyroidism     Chronic, stable. Continue levothyroxine 88 mcg daily.          Relevant Medications    levothyroxine (SYNTHROID) 88 MCG Tab    Hyperlipidemia     Chronic, unstable. Discussed healthy  lifestyle recommendations.   The 10-year ASCVD risk score (Jack SMITH, et al., 2019) is: 9.3%  Plan to recheck 6 months, discussed options for treatment, would like to work on lifestyle before starting cholesterol lowering medications.          Relevant Orders    Lipid Profile    Prediabetes     Chronic, unstable. Discussed healthy lifestyle recommendations.   Will recheck 6 months.          Other Visit Diagnoses       Encounter to establish care        Need for vaccination        Relevant Orders    Pneumococcal Conjugate Vaccine 20-Valent (19 yrs+)    Elevated random blood glucose level        Relevant Orders    HEMOGLOBIN A1C    Screening for deficiency anemia        Relevant Orders    CBC WITHOUT DIFFERENTIAL            Patient was educated in proper administration of medication(s) ordered today including safety, possible SE, risks, benefits, rationale and alternatives to therapy.   Supportive care, differential diagnoses, and indications for immediate follow-up discussed with patient.    Pathogenesis of diagnosis discussed including typical length and natural progression.    Instructed to return to clinic or nearest emergency department for any change in condition, further concerns, or worsening of symptoms.  Patient states understanding of the plan of care and discharge instructions.    Return in about 6 months (around 3/7/2024) for Labs.    Please note that this dictation was created using voice recognition software. I have made every reasonable attempt to correct obvious errors, but I expect that there are errors of grammar and possibly content that I did not discover before finalizing the note.

## 2024-02-24 ENCOUNTER — OFFICE VISIT (OUTPATIENT)
Dept: URGENT CARE | Facility: PHYSICIAN GROUP | Age: 69
End: 2024-02-24
Payer: MEDICARE

## 2024-02-24 VITALS
BODY MASS INDEX: 28.17 KG/M2 | TEMPERATURE: 97.8 F | OXYGEN SATURATION: 93 % | WEIGHT: 165 LBS | RESPIRATION RATE: 18 BRPM | HEIGHT: 64 IN | SYSTOLIC BLOOD PRESSURE: 126 MMHG | HEART RATE: 92 BPM | DIASTOLIC BLOOD PRESSURE: 78 MMHG

## 2024-02-24 DIAGNOSIS — H61.22 HEARING LOSS OF LEFT EAR DUE TO CERUMEN IMPACTION: ICD-10-CM

## 2024-02-24 PROCEDURE — 99213 OFFICE O/P EST LOW 20 MIN: CPT | Performed by: NURSE PRACTITIONER

## 2024-02-24 PROCEDURE — 3074F SYST BP LT 130 MM HG: CPT | Performed by: NURSE PRACTITIONER

## 2024-02-24 PROCEDURE — 3078F DIAST BP <80 MM HG: CPT | Performed by: NURSE PRACTITIONER

## 2024-02-24 PROCEDURE — 1126F AMNT PAIN NOTED NONE PRSNT: CPT | Performed by: NURSE PRACTITIONER

## 2024-02-24 ASSESSMENT — ENCOUNTER SYMPTOMS
NAUSEA: 0
HEADACHES: 0
DIZZINESS: 0

## 2024-02-24 ASSESSMENT — PAIN SCALES - GENERAL: PAINLEVEL: NO PAIN

## 2024-02-24 NOTE — PROGRESS NOTES
Subjective     Jenn Petersen is a 69 y.o. female who presents with Otalgia            HPI  New problem.  Patient is a 61-year-old female who presents with hearing loss and fullness of her left ear for several days.  She reports that she thinks that this is wax.  She denies any dizziness, headache, or nausea.  She denies any nasal congestion.  She has done some earwax softening drops for this but has not flushed it out.    Patient has no known allergies.  Current Outpatient Medications on File Prior to Visit   Medication Sig Dispense Refill    Ascorbic Acid (VITAMIN C) 1000 MG Tab Take  by mouth.      Oral Electrolytes (EMERGEN-C ELECTRO MIX PO) Take  by mouth.      BIOTIN PO Take  by mouth.      levothyroxine (SYNTHROID) 88 MCG Tab Take 1 Tablet by mouth every morning on an empty stomach. 100 Tablet 3     No current facility-administered medications on file prior to visit.     Social History     Socioeconomic History    Marital status:      Spouse name: Not on file    Number of children: Not on file    Years of education: Not on file    Highest education level: Some college, no degree   Occupational History    Not on file   Tobacco Use    Smoking status: Never    Smokeless tobacco: Never   Vaping Use    Vaping Use: Never used   Substance and Sexual Activity    Alcohol use: Yes     Comment: occasionally    Drug use: No    Sexual activity: Not on file   Other Topics Concern    Not on file   Social History Narrative    Not on file     Social Determinants of Health     Financial Resource Strain: Low Risk  (3/9/2022)    Overall Financial Resource Strain (CARDIA)     Difficulty of Paying Living Expenses: Not very hard   Food Insecurity: No Food Insecurity (3/9/2022)    Hunger Vital Sign     Worried About Running Out of Food in the Last Year: Never true     Ran Out of Food in the Last Year: Never true   Transportation Needs: No Transportation Needs (3/9/2022)    PRAPARE - Transportation     Lack of Transportation  "(Medical): No     Lack of Transportation (Non-Medical): No   Physical Activity: Insufficiently Active (3/9/2022)    Exercise Vital Sign     Days of Exercise per Week: 7 days     Minutes of Exercise per Session: 20 min   Stress: No Stress Concern Present (3/9/2022)    [employer] - Occupational Stress Questionnaire     Feeling of Stress : Only a little   Social Connections: Moderately Isolated (3/9/2022)    Social Connection and Isolation Panel [NHANES]     Frequency of Communication with Friends and Family: More than three times a week     Frequency of Social Gatherings with Friends and Family: Patient declined     Attends Church Services: Never     Active Member of Clubs or Organizations: No     Attends Club or Organization Meetings: Never     Marital Status:    Intimate Partner Violence: Not on file   Housing Stability: Low Risk  (3/9/2022)    Housing Stability Vital Sign     Unable to Pay for Housing in the Last Year: No     Number of Places Lived in the Last Year: 1     Unstable Housing in the Last Year: No     Breast Cancer-related family history is not on file.      Review of Systems   HENT:  Positive for ear pain and hearing loss.    Gastrointestinal:  Negative for nausea.   Neurological:  Negative for dizziness and headaches.              Objective     /78 (BP Location: Left arm, Patient Position: Sitting, BP Cuff Size: Adult)   Pulse 92   Temp 36.6 °C (97.8 °F) (Temporal)   Resp 18   Ht 1.626 m (5' 4\")   Wt 74.8 kg (165 lb)   SpO2 93%   BMI 28.32 kg/m²      Physical Exam  Vitals and nursing note reviewed.   Constitutional:       Appearance: Normal appearance. She is not ill-appearing.   HENT:      Right Ear: Tympanic membrane normal.      Ears:      Comments: Some cerumen in left ear.  Cardiovascular:      Rate and Rhythm: Normal rate and regular rhythm.      Heart sounds: No murmur heard.  Pulmonary:      Effort: Pulmonary effort is normal.      Breath " sounds: Normal breath sounds.   Musculoskeletal:         General: Normal range of motion.   Skin:     General: Skin is warm and dry.   Neurological:      General: No focal deficit present.      Mental Status: She is alert and oriented to person, place, and time.                             Assessment & Plan        1. Hearing loss of left ear due to cerumen impaction          Clear on re examination.  Follow up as needed.

## 2024-04-15 ENCOUNTER — TELEPHONE (OUTPATIENT)
Dept: MEDICAL GROUP | Facility: MEDICAL CENTER | Age: 69
End: 2024-04-15

## 2024-04-15 ENCOUNTER — OFFICE VISIT (OUTPATIENT)
Dept: MEDICAL GROUP | Facility: MEDICAL CENTER | Age: 69
End: 2024-04-15
Payer: MEDICARE

## 2024-04-15 VITALS
OXYGEN SATURATION: 99 % | HEIGHT: 63 IN | RESPIRATION RATE: 16 BRPM | TEMPERATURE: 97.7 F | DIASTOLIC BLOOD PRESSURE: 70 MMHG | BODY MASS INDEX: 29.06 KG/M2 | HEART RATE: 78 BPM | WEIGHT: 164 LBS | SYSTOLIC BLOOD PRESSURE: 122 MMHG

## 2024-04-15 DIAGNOSIS — H93.8X2 EAR FULLNESS, LEFT: ICD-10-CM

## 2024-04-15 PROCEDURE — 99214 OFFICE O/P EST MOD 30 MIN: CPT | Performed by: FAMILY MEDICINE

## 2024-04-15 PROCEDURE — 3074F SYST BP LT 130 MM HG: CPT | Performed by: FAMILY MEDICINE

## 2024-04-15 PROCEDURE — 3078F DIAST BP <80 MM HG: CPT | Performed by: FAMILY MEDICINE

## 2024-04-15 RX ORDER — NEOMYCIN SULFATE, POLYMYXIN B SULFATE AND HYDROCORTISONE 10; 3.5; 1 MG/ML; MG/ML; [USP'U]/ML
4 SUSPENSION/ DROPS AURICULAR (OTIC) 3 TIMES DAILY
Qty: 10 ML | Refills: 0 | Status: SHIPPED | OUTPATIENT
Start: 2024-04-15

## 2024-04-15 RX ORDER — CIPROFLOXACIN AND DEXAMETHASONE 3; 1 MG/ML; MG/ML
4 SUSPENSION/ DROPS AURICULAR (OTIC) 2 TIMES DAILY
Qty: 2.8 ML | Refills: 0 | Status: SHIPPED | OUTPATIENT
Start: 2024-04-15 | End: 2024-04-15

## 2024-04-15 RX ORDER — AMOXICILLIN AND CLAVULANATE POTASSIUM 875; 125 MG/1; MG/1
1 TABLET, FILM COATED ORAL 2 TIMES DAILY
Qty: 10 TABLET | Refills: 0 | Status: SHIPPED | OUTPATIENT
Start: 2024-04-15 | End: 2024-04-20

## 2024-04-15 ASSESSMENT — PATIENT HEALTH QUESTIONNAIRE - PHQ9: CLINICAL INTERPRETATION OF PHQ2 SCORE: 0

## 2024-04-15 NOTE — PROGRESS NOTES
"Verbal consent was acquired by the patient to use [employer] ambient listening note generation during this visit    Subjective:     CC: \"ear fullness\"    History of Present Illness  The patient is a 69-year-old female presenting with a sensation of fullness and possible blockage in the left ear, accompanied by a palpable mass beneath it.    Approximately 30 years ago, the patient experienced significant cerumen accumulation in her right ear, which she initially attributed to a dental issue. An ENT consultation led to the administration of antibiotics, which resulted in a reduction in the swelling. Three days later, the cerumen was extracted and she was diagnosed with an inner ear cyst. Following the procedure, she was advised to avoid swimming activities. On 02/24/2024, she sought medical attention at an urgent care facility where her left ear was irrigated, resulting in a small amount of cerumen dislodged. Approximately two weeks post-procedure, she experienced a resurgence of a plugged sensation in her left ear. She acknowledges frequent picking at her ear and attempted to alleviate the discomfort with Debrox, which unfortunately did not yield the desired results. Consequently, she resorted to using another box of Debrox with a small syringe since Tuesday to facilitate cerumen removal. She suspects the presence of a hairball in her ear, despite the absence of significant cerumen. She reports no pain in her ear, but notes discomfort when manipulating her ear. She avoids using Q-tips, instead opting to unplug her ear, which subsequently improves her hearing. However, she noticed a palpable mass beneath her ear, which she believes began after she started manipulating her ear. She denies any systemic symptoms such as fevers, chills, sore throat, or congestion. Prior to her ear issue in 02/2024, she experienced a severe cough lasting 3 weeks, which has since resolved. She reports a sticky sensation in her ear, which " "she attributes to the use of Debrox. Her sister, who is currently in [place], suspects she may have tinnitus.          Objective:     Exam:  /70   Pulse 78   Temp 36.5 °C (97.7 °F) (Temporal)   Resp 16   Ht 1.6 m (5' 3\")   Wt 74.4 kg (164 lb)   SpO2 99%   Breastfeeding No   BMI 29.05 kg/m²  Body mass index is 29.05 kg/m².    Physical Exam  Vitals reviewed.   Constitutional:       General: She is not in acute distress.     Appearance: Normal appearance.   HENT:      Head: Normocephalic and atraumatic.      Right Ear: Tympanic membrane, ear canal and external ear normal.      Left Ear: There is impacted cerumen.   Cardiovascular:      Rate and Rhythm: Normal rate and regular rhythm.      Heart sounds: Normal heart sounds.   Pulmonary:      Effort: Pulmonary effort is normal. No respiratory distress.      Breath sounds: Normal breath sounds.   Lymphadenopathy:      Cervical: Cervical adenopathy (left retromandibular node is prominent, firm, nontender) present.   Skin:     General: Skin is warm and dry.   Neurological:      Mental Status: She is alert. Mental status is at baseline.      Gait: Gait normal.   Psychiatric:         Mood and Affect: Mood normal.         Behavior: Behavior normal.           Results        Assessment & Plan:       1. Ear fullness, left  - Ear Cerumen Removal  - amoxicillin-clavulanate (AUGMENTIN) 875-125 MG Tab; Take 1 Tablet by mouth 2 times a day for 5 days.  Dispense: 10 Tablet; Refill: 0  - ciprofloxacin/dexamethasone (CIPRODEX) 0.3-0.1 % Suspension; Administer 4 Drops into affected ear(s) 2 times a day for 7 days.  Dispense: 2.8 mL; Refill: 0      Assessment & Plan  1. Sensation of fullness and potential blockage in the left ear.  An ear irrigation will be conducted to ascertain the absence of cerumen.  After MA performed lavage she was able to wash out the cerumen plug it came out all is 1.  It appeared mucousy.  The tympanic membrane was still intact the patient felt like " she could hear well and had good balance when ambulating out.  She tolerated procedure well which is always good.  She had a macerated external canal concerning for otitis externa.  This is most likely a reactive lymph node that is the bump ponies her ear.  Given how long this has been going on and lymph node reaction and how her ear looks I will do a 5-day course of Augmentin along with a 7-day course of external eardrops to try to treat for infection.  If she were to continue to have the mass underneath her ear or get new ones that we will have her back in clinic to be evaluated for other concerning possibilities.       Return if symptoms worsen or fail to improve.      This note was created using voice recognition software (Dragon). The accuracy of the dictation is limited by the abilities of the software. I have reviewed the note prior to signing, however some errors in grammar and context are still possible. If you have any questions related to this note please do not hesitate to contact our office.

## 2024-05-24 ENCOUNTER — OFFICE VISIT (OUTPATIENT)
Dept: URGENT CARE | Facility: PHYSICIAN GROUP | Age: 69
End: 2024-05-24
Payer: MEDICARE

## 2024-05-24 VITALS
DIASTOLIC BLOOD PRESSURE: 82 MMHG | WEIGHT: 160.8 LBS | BODY MASS INDEX: 28.49 KG/M2 | HEART RATE: 69 BPM | RESPIRATION RATE: 18 BRPM | HEIGHT: 63 IN | SYSTOLIC BLOOD PRESSURE: 122 MMHG | TEMPERATURE: 97.9 F | OXYGEN SATURATION: 100 %

## 2024-05-24 DIAGNOSIS — J01.40 ACUTE NON-RECURRENT PANSINUSITIS: ICD-10-CM

## 2024-05-24 DIAGNOSIS — R05.1 ACUTE COUGH: ICD-10-CM

## 2024-05-24 DIAGNOSIS — H60.392 OTHER INFECTIVE ACUTE OTITIS EXTERNA OF LEFT EAR: ICD-10-CM

## 2024-05-24 PROCEDURE — 3079F DIAST BP 80-89 MM HG: CPT | Performed by: PHYSICIAN ASSISTANT

## 2024-05-24 PROCEDURE — 3074F SYST BP LT 130 MM HG: CPT | Performed by: PHYSICIAN ASSISTANT

## 2024-05-24 PROCEDURE — 99213 OFFICE O/P EST LOW 20 MIN: CPT | Performed by: PHYSICIAN ASSISTANT

## 2024-05-24 RX ORDER — FLUTICASONE PROPIONATE 50 MCG
1 SPRAY, SUSPENSION (ML) NASAL DAILY
Qty: 16 G | Refills: 0 | Status: SHIPPED | OUTPATIENT
Start: 2024-05-24

## 2024-05-24 RX ORDER — NEOMYCIN SULFATE, POLYMYXIN B SULFATE AND HYDROCORTISONE 10; 3.5; 1 MG/ML; MG/ML; [USP'U]/ML
4 SUSPENSION/ DROPS AURICULAR (OTIC) 4 TIMES DAILY
Qty: 16 ML | Refills: 0 | Status: SHIPPED | OUTPATIENT
Start: 2024-05-24 | End: 2024-06-03

## 2024-05-24 RX ORDER — BENZONATATE 100 MG/1
100 CAPSULE ORAL 3 TIMES DAILY PRN
Qty: 60 CAPSULE | Refills: 0 | Status: SHIPPED | OUTPATIENT
Start: 2024-05-24

## 2024-05-24 ASSESSMENT — ENCOUNTER SYMPTOMS
EYE PAIN: 0
WHEEZING: 0
DIARRHEA: 0
HEADACHES: 0
FEVER: 0
CONSTIPATION: 0
SORE THROAT: 0
SHORTNESS OF BREATH: 0
SPUTUM PRODUCTION: 1
VOMITING: 0
DIZZINESS: 0
SINUS PAIN: 0
ABDOMINAL PAIN: 0
EYE REDNESS: 0
DIAPHORESIS: 0
EYE DISCHARGE: 0
COUGH: 1
NAUSEA: 0
CHILLS: 0

## 2024-05-24 NOTE — PROGRESS NOTES
"  Subjective:     Jenn Petersen  is a 69 y.o. female who presents for Ear Fullness (Left ear, has been drained and cleaned. Says it still feels full. Having this problem since February. ) and Cough (Coughing up phlegm, yellow coloration. X 3-4 days )       She presents today with left-sided ear fullness that has been present for the past several days.  She notes recurrent history of left-sided ear fullness being present over the past 3 to 4 months.  Has been evaluated by her primary care provider for these symptoms.  She has historically had cerumen impaction in the ears as well as external ear infections.  She denies any drainage or discharge from the ear.  Notes reduced hearing out of the ear.  She is currently experiencing sinus congestion as well as a cough for the past 3-4 days.  Cough is productive.  Denies fever/chills/sweats, chest pain, shortness of breath, nausea/vomiting, abdominal pain, diarrhea.       Review of Systems   Constitutional:  Negative for chills, diaphoresis, fever and malaise/fatigue.   HENT:  Positive for congestion, ear pain and hearing loss. Negative for ear discharge, sinus pain and sore throat.    Eyes:  Negative for pain, discharge and redness.   Respiratory:  Positive for cough and sputum production. Negative for shortness of breath and wheezing.    Cardiovascular:  Negative for chest pain.   Gastrointestinal:  Negative for abdominal pain, constipation, diarrhea, nausea and vomiting.   Neurological:  Negative for dizziness and headaches.      No Known Allergies  Past Medical History:   Diagnosis Date    Medicare annual wellness visit, initial 7/26/2018    Risk for falls 3/9/2022    Thyroid disease         Objective:   /82 (BP Location: Right arm, Patient Position: Sitting, BP Cuff Size: Adult)   Pulse 69   Temp 36.6 °C (97.9 °F) (Temporal)   Resp 18   Ht 1.6 m (5' 3\")   Wt 72.9 kg (160 lb 12.8 oz)   SpO2 100%   BMI 28.48 kg/m²   Physical Exam  Vitals and nursing note " reviewed.   Constitutional:       General: She is not in acute distress.     Appearance: Normal appearance. She is not ill-appearing, toxic-appearing or diaphoretic.   HENT:      Head: Normocephalic.      Right Ear: Tympanic membrane, ear canal and external ear normal. There is no impacted cerumen.      Left Ear: Tympanic membrane and external ear normal. There is no impacted cerumen.      Ears:      Comments: Examination of the left ear does reveal erythema and edema within the external canal.  There is drainage and discharge out of the external canal.  TM is normal.     Nose: Congestion present. No rhinorrhea.      Mouth/Throat:      Mouth: Mucous membranes are moist.      Pharynx: No oropharyngeal exudate or posterior oropharyngeal erythema.   Eyes:      General:         Right eye: No discharge.         Left eye: No discharge.      Conjunctiva/sclera: Conjunctivae normal.   Cardiovascular:      Rate and Rhythm: Normal rate and regular rhythm.   Pulmonary:      Effort: Pulmonary effort is normal. No respiratory distress.      Breath sounds: Normal breath sounds. No stridor. No wheezing or rhonchi.   Musculoskeletal:      Cervical back: Neck supple.   Lymphadenopathy:      Cervical: No cervical adenopathy.   Neurological:      General: No focal deficit present.      Mental Status: She is alert and oriented to person, place, and time.   Psychiatric:         Mood and Affect: Mood normal.         Behavior: Behavior normal.         Thought Content: Thought content normal.         Judgment: Judgment normal.             Diagnostic testing: None    Assessment/Plan:     Encounter Diagnoses   Name Primary?    Other infective acute otitis externa of left ear     Acute non-recurrent pansinusitis     Acute cough           Plan for care for today's complaint includes start the patient on neomycin/polymyxin eardrops for left-sided acute otitis external seen on exam today.  Patient did request this drop as Ciprodex drops were  historically expensive at the pharmacy for her.  Flonase for sinusitis symptom support, Tessalon Perles for acute cough.  No evidence of otitis media on exam.  Patient did have elevated blood pressure when initially taking during the rooming process by the medical assistant, recheck at the end of the visit did reveal BP below 140/90. Please see vital signs from this visit for further information of the BP readings.  Prescription for neomycin/polymyxin, Flonase, Tessalon Perles provided.    See AVS Instructions below for written guidance provided to patient on after-visit management and care in addition to our verbal discussion during the visit.    Please note that this dictation was created using voice recognition software. I have attempted to correct all errors, but there may be sound-alike, spelling, grammar and possibly content errors that I did not discover before finalizing the note.    Rashad Musa PA-C

## 2024-06-12 ENCOUNTER — OFFICE VISIT (OUTPATIENT)
Dept: MEDICAL GROUP | Facility: PHYSICIAN GROUP | Age: 69
End: 2024-06-12
Payer: MEDICARE

## 2024-06-12 VITALS
HEIGHT: 63 IN | HEART RATE: 75 BPM | RESPIRATION RATE: 20 BRPM | DIASTOLIC BLOOD PRESSURE: 70 MMHG | TEMPERATURE: 96.6 F | SYSTOLIC BLOOD PRESSURE: 122 MMHG | OXYGEN SATURATION: 98 % | BODY MASS INDEX: 28.42 KG/M2 | WEIGHT: 160.4 LBS

## 2024-06-12 DIAGNOSIS — E78.00 PURE HYPERCHOLESTEROLEMIA: ICD-10-CM

## 2024-06-12 DIAGNOSIS — Z91.89 10 YEAR RISK OF MI OR STROKE 7.5% OR GREATER: ICD-10-CM

## 2024-06-12 DIAGNOSIS — E55.9 VITAMIN D DEFICIENCY: ICD-10-CM

## 2024-06-12 DIAGNOSIS — Z13.6 SCREENING FOR CARDIOVASCULAR CONDITION: ICD-10-CM

## 2024-06-12 DIAGNOSIS — Z78.0 POSTMENOPAUSAL: ICD-10-CM

## 2024-06-12 DIAGNOSIS — R73.09 ELEVATED HEMOGLOBIN A1C: ICD-10-CM

## 2024-06-12 DIAGNOSIS — Z12.31 ENCOUNTER FOR SCREENING MAMMOGRAM FOR BREAST CANCER: ICD-10-CM

## 2024-06-12 DIAGNOSIS — R73.03 PREDIABETES: ICD-10-CM

## 2024-06-12 DIAGNOSIS — Z12.11 SCREENING FOR COLORECTAL CANCER: ICD-10-CM

## 2024-06-12 DIAGNOSIS — Z13.0 SCREENING FOR DEFICIENCY ANEMIA: ICD-10-CM

## 2024-06-12 DIAGNOSIS — E03.9 HYPOTHYROIDISM, UNSPECIFIED TYPE: ICD-10-CM

## 2024-06-12 DIAGNOSIS — Z12.12 SCREENING FOR COLORECTAL CANCER: ICD-10-CM

## 2024-06-12 PROCEDURE — G0439 PPPS, SUBSEQ VISIT: HCPCS

## 2024-06-12 ASSESSMENT — PATIENT HEALTH QUESTIONNAIRE - PHQ9: CLINICAL INTERPRETATION OF PHQ2 SCORE: 0

## 2024-06-12 ASSESSMENT — ACTIVITIES OF DAILY LIVING (ADL): BATHING_REQUIRES_ASSISTANCE: 0

## 2024-06-12 ASSESSMENT — ENCOUNTER SYMPTOMS: GENERAL WELL-BEING: GOOD

## 2024-06-12 NOTE — ASSESSMENT & PLAN NOTE
Chronic, stable. Discussed healthy lifestyle recommendations.   The 10-year ASCVD risk score (Jack SMITH, et al., 2019) is: 7.7%  Plan to recheck 6-12 months

## 2024-06-12 NOTE — PROGRESS NOTES
Solis Musa, [facility] Ass't  Medical Assistant     Progress Notes     Signed     Creation Time: 6/12/2024  9:53 AM     Expand All Collapse All      Chief Complaint   Patient presents with    Annual Wellness Visit         HPI:  Jenn Petersen is a 69 y.o. here for Medicare Annual Wellness Visit           Patient Active Problem List     Diagnosis Date Noted    10 year risk of MI or stroke 7.5% or greater 06/12/2024    Prediabetes 09/07/2023    History of colonic polyps 03/11/2014    Hyperlipidemia 12/15/2011    Hypothyroidism 04/09/2009         Current Medications          Current Outpatient Medications   Medication Sig Dispense Refill    benzonatate (TESSALON) 100 MG Cap Take 1 Capsule by mouth 3 times a day as needed for Cough. 60 Capsule 0    fluticasone (FLONASE) 50 MCG/ACT nasal spray Administer 1 Spray into affected nostril(S) every day. 16 g 0    neomycin-polymyxin-HC (PEDIOTIC HC) 3.5-30655-4 Suspension Administer 4 Drops into affected ear(s) 3 times a day. 10 mL 0    Oral Electrolytes (EMERGEN-C ELECTRO MIX PO) Take  by mouth.        levothyroxine (SYNTHROID) 88 MCG Tab Take 1 Tablet by mouth every morning on an empty stomach. 100 Tablet 3      No current facility-administered medications for this visit.             Current supplements as per medication list.      Allergies: Patient has no known allergies.     Current social contact/activities: rides bike, volunteer at food bank.       She  reports that she has never smoked. She has never used smokeless tobacco. She reports current alcohol use. She reports that she does not use drugs.  Counseling given: Not Answered        ROS:    Gait: Uses no assistive device  Ostomy: No  Other tubes: No  Amputations: No  Chronic oxygen use: No  Last eye exam: 4 years ago  Wears hearing aids: No   : Denies any urinary leakage during the last 6 months     Screening:  Depression Screening  Little interest or pleasure in doing things?  0 - not at all  Feeling down,  depressed , or hopeless? 0 - not at all  Patient Health Questionnaire Score: 0      If depressive symptoms identified deferred to follow up visit unless specifically addressed in assessment and plan.     Interpretation of PHQ-9 Total Score   Score Severity   1-4 No Depression   5-9 Mild Depression   10-14 Moderate Depression   15-19 Moderately Severe Depression   20-27 Severe Depression     Screening for Cognitive Impairment  Do you or any of your friends or family members have any concern about your memory? No  Three Minute Recall (Leader, Season, Table) 1/3    Jose Martin clock face with all 12 numbers and set the hands to show 10 minutes after 11.  Yes    Cognitive concerns identified deferred for follow up unless specifically addressed in assessment and plan.     Fall Risk Assessment  Has the patient had two or more falls in the last year or any fall with injury in the last year?  No     Safety Assessment  Do you always wear your seatbelt?  Yes  Any changes to home needed to function safely? No  Difficulty hearing.  No  Patient counseled about all safety risks that were identified.     Functional Assessment ADLs  Are there any barriers preventing you from cooking for yourself or meeting nutritional needs?  No.    Are there any barriers preventing you from driving safely or obtaining transportation?  No.    Are there any barriers preventing you from using a telephone or calling for help?  No    Are there any barriers preventing you from shopping?  No.    Are there any barriers preventing you from taking care of your own finances?  No    Are there any barriers preventing you from managing your medications?  No    Are there any barriers preventing you from showering, bathing or dressing yourself? No    Are there any barriers preventing you from doing housework or laundry? No  Are there any barriers preventing you from using the toilet?No  Are you currently engaging in any exercise or physical activity?  Yes. Rides bike      Self-Assessment of Health  What is your perception of your health? Good  Do you sleep more than six hours a night? Yes  In the past 7 days, how much did pain keep you from doing your normal work? None  Do you spend quality time with family or friends (virtually or in person)? Yes  Do you usually eat a heart healthy diet that constists of a variety of fruits, vegetables, whole grains and fiber? Yes  Do you eat foods high in fat and/or Fast Food more than three times per week? No     Advance Care Planning  Do you have an Advance Directive, Living Will, Durable Power of , or POLST? No                   Health Maintenance Summary              Ordered - Colorectal Cancer Screening (View Topic Details) Ordered on 6/12/2024        No completion history exists for this topic.                  Ordered - Mammogram (Every 2 Years) Ordered on 6/12/2024 03/28/2022   MA-SCREENING MAMMO BILAT W/TOMOSYNTHESIS W/CAD     02/19/2020   MA-SCREENING MAMMO BILAT W/TOMOSYNTHESIS W/CAD     07/08/2017   MA-SCREENING MAMMO BILAT W/TOMOSYNTHESIS W/CAD     02/10/2015   MA-DIAGNOSTIC MAMMO RIGHT W/TOMOSYNTHESIS W/CAD     02/02/2015   MA-SCREENING MAMMO BILAT W/TOMOSYNTHESIS W/CAD     Only the first 5 history entries have been loaded, but more history exists.                  Postponed - COVID-19 Vaccine (5 - 2023-24 season) Postponed until 9/7/2024 03/07/2023   Imm Admin: MODERNA BIVALENT BOOSTER SARS-COV-2 VACCINE (6+)     07/18/2022   Imm Admin: MODERNA SARS-COV-2 VACCINE (12+)     11/22/2021   Imm Admin: MODERNA SARS-COV-2 VACCINE (12+)     04/06/2021   Imm Admin: MODERNA SARS-COV-2 VACCINE (12+)     03/09/2021   Imm Admin: MODERNA SARS-COV-2 VACCINE (12+)     Only the first 5 history entries have been loaded, but more history exists.                  Postponed - Zoster (Shingles) Vaccines (2 of 2) Postponed until 11/12/2024 03/11/2022   Imm Admin: Zoster Vaccine Recombinant (RZV) (SHINGRIX)                   Influenza Vaccine (Season Ended) Next due on 9/1/2024        10/09/2021   Imm Admin: Influenza Vaccine Quad Inj (Pf)     01/25/2016   Imm Admin: Influenza Vaccine Quad Inj (Pf)     10/17/2013   Imm Admin: Influenza, Unspecified - HISTORICAL DATA     09/20/2011   Imm Admin: Influenza Seasonal Injectable - Historical Data                  Annual Wellness Visit (Yearly) Next due on 6/12/2025 06/12/2024   Level of Service: ANNUAL WELLNESS VISIT-INCLUDES PPPS SUBSEQUE*     07/10/2023   Prob Dx: Medicare annual wellness visit, initial     03/09/2022   Visit Dx: Medicare annual wellness visit, initial                  Bone Density Scan (Every 5 Years) Next due on 3/28/2027        03/28/2022   DS-BONE DENSITY STUDY (DEXA)                  IMM DTaP/Tdap/Td Vaccine (3 - Td or Tdap) Next due on 4/12/2032 04/12/2022   Imm Admin: Tdap Vaccine     10/15/2010   Imm Admin: Tdap Vaccine     05/10/2004   Imm Admin: TD Vaccine     01/01/1992   Imm Admin: TD Vaccine                  Hepatitis C Screening  Completed        09/01/2023   Hepatitis C Antibody component of HEP C VIRUS ANTIBODY                  Pneumococcal Vaccine: 65+ Years (Series Information) Completed        09/07/2023   Imm Admin: Pneumococcal Conjugate Vaccine (PCV20)     04/01/2022   Imm Admin: Pneumococcal polysaccharide vaccine (PPSV-23)                  Hepatitis A Vaccine (Hep A) (Series Information) Aged Out        No completion history exists for this topic.                  Hepatitis B Vaccine (Hep B) (Series Information) Aged Out        No completion history exists for this topic.                  HPV Vaccines (Series Information) Aged Out        No completion history exists for this topic.                  Polio Vaccine (Inactivated Polio) (Series Information) Aged Out        No completion history exists for this topic.                  Meningococcal Immunization (Series Information) Aged Out        No completion history exists for this topic.  "                         Patient Care Team:  ERIN Rondon as PCP - General (Nurse Practitioner Family)           Social History            Tobacco Use    Smoking status: Never    Smokeless tobacco: Never   Vaping Use    Vaping status: Never Used   Substance Use Topics    Alcohol use: Yes       Comment: occasionally    Drug use: No      Family History         Family History   Problem Relation Age of Onset    Diabetes Mother           prediabetes    Heart Disease Mother      Cancer Father           bone cancer    Hypertension Sister      Blood Disease Brother           black vend anemia    Thyroid Brother      Breast Cancer Maternal Aunt      Cancer Maternal Grandmother           pancreatic    Ovarian Cancer Neg Hx      Tubal Cancer Neg Hx      Peritoneal Cancer Neg Hx      Colorectal Cancer Neg Hx      Hyperlipidemia Neg Hx      Stroke Neg Hx           She  has a past medical history of Medicare annual wellness visit, initial (7/26/2018), Risk for falls (3/9/2022), and Thyroid disease.   Past Surgical History         Past Surgical History:   Procedure Laterality Date    BREAST BIOPSY Right       cyst excision         Exam:   /70 (BP Location: Left arm, Patient Position: Sitting, BP Cuff Size: Adult)   Pulse 75   Temp 35.9 °C (96.6 °F) (Temporal)   Resp 20   Ht 1.6 m (5' 3\")   Wt 72.8 kg (160 lb 6.4 oz)   SpO2 98%  Body mass index is 28.41 kg/m².     Hearing good.    Dentition good  Alert, oriented in no acute distress.  Eye contact is good, speech goal directed, affect calm     Assessment and Plan. The following treatment and monitoring plan is recommended:    Problem List Items Addressed This Visit         Hypothyroidism       Chronic, stable. Continue levothyroxine 88 mcg daily.           Relevant Orders     TSH     Hyperlipidemia       Chronic, stable. Discussed healthy lifestyle recommendations.   The 10-year ASCVD risk score (Jack SMITH, et al., 2019) is: 7.7%  Plan to recheck 6-12 " months           Relevant Orders     CT-CARDIAC SCORING     Lipid Profile     Prediabetes       Chronic, stable. Discussed healthy lifestyle recommendations.   Plan to recheck every 6-12 months           10 year risk of MI or stroke 7.5% or greater       Chronic, stable. Discussed healthy lifestyle recommendations.   Discussed CT cardiac score           Relevant Orders     CT-CARDIAC SCORING      Other Visit Diagnoses         Screening for colorectal cancer         Relevant Orders     COLOGUARD (FIT DNA)     Encounter for screening mammogram for breast cancer         Relevant Orders     MA-SCREENING MAMMO BILAT W/TOMOSYNTHESIS W/CAD     Elevated hemoglobin A1c         Relevant Orders     HEMOGLOBIN A1C     Postmenopausal         Relevant Orders     VITAMIN D,25 HYDROXY (DEFICIENCY)     Vitamin D deficiency         Relevant Orders     VITAMIN D,25 HYDROXY (DEFICIENCY)     Screening for cardiovascular condition         Relevant Orders     Comp Metabolic Panel     Lipid Profile     Screening for deficiency anemia         Relevant Orders     CBC WITHOUT DIFFERENTIAL             Services suggested: No services needed at this time  Health Care Screening: Age-appropriate preventive services recommended by USPTF and ACIP covered by Medicare were discussed today. Services ordered if indicated and agreed upon by the patient.  Referrals offered: Community-based lifestyle interventions to reduce health risks and promote self-management and wellness, fall prevention, nutrition, physical activity, tobacco-use cessation, weight loss, and mental health services as per orders if indicated.     Discussion today about general wellness and lifestyle habits:    Prevent falls and reduce trip hazards; Cautioned about securing or removing rugs.  Have a working fire alarm and carbon monoxide detector;   Engage in regular physical activity and social activities      Follow-up: Return in about 3 months (around 9/12/2024) for Labs, wellness.

## 2024-06-12 NOTE — PATIENT INSTRUCTIONS
"Cholesterol-lowering supplements may be helpful  Diet and exercise are proven ways to reduce cholesterol. Cholesterol-lowering supplements may help, too.  By [facility] Staff  If you're worried about your cholesterol level and have started exercising and eating healthier foods, you might wonder if a dietary supplement could help. With your doctor's OK, here are some cholesterol-improving supplements to consider.  Cholesterol-improving supplement What it might do Side effects and drug interactions   Berberine May reduce low-density lipoprotein (LDL, or \"bad\") cholesterol and triglycerides May cause diarrhea, constipation, gas, nausea or vomiting; may cause harm to babies during pregnancy and breastfeeding   Fish oil May reduce triglycerides May cause a fishy aftertaste, bad breath, gas, nausea, vomiting or diarrhea; may interact with some blood-thinning medications   Flaxseed, ground May reduce LDL cholesterol May cause gas, bloating or diarrhea; may interact with some blood-thinning medications   Garlic May slightly reduce cholesterol but studies have been conflicting May cause bad breath, body odor, nausea, vomiting and gas; may interact with some blood-thinning medications   Green tea or green tea extract May lower LDL cholesterol May cause nausea, vomiting, gas or diarrhea; may interact with blood-thinning medications   Niacin May lower LDL cholesterol and triglycerides; may improve high-density lipoprotein (HDL, or \"good\") cholesterol May cause itching and flushing, which are more common at the higher doses usually needed to have an effect on cholesterol   Plant stanols and sterols May reduce LDL cholesterol, particularly in people with a genetic condition that causes high cholesterol (familial hypercholesterolemia) May cause diarrhea   Red yeast rice -- Natural doesn't mean safe  Some red yeast rice products contain a substance (monacolin K) that is chemically identical to the active ingredient in lovastatin " (Altoprev), a prescription medication that lowers cholesterol. Because there is variability in quality from , the amount of monacolin K can vary widely from product to product.  Products that contain monacolin K can cause the same types of side effects as lovastatin, which include damage to the muscles, kidneys and liver. In the [place], the Food and Drug Administration has ruled that dietary supplements that contain more than trace amounts of monacolin K are unapproved drugs and can't be sold legally as dietary supplements.  Dietary supplements may not be enough  While dietary supplements can help, you might also need prescription medications to get your cholesterol numbers to a safe level. Be sure to tell your doctor if you take any type of dietary supplement, because some can interact with medications you may be taking.

## 2024-06-12 NOTE — PROGRESS NOTES
Chief Complaint   Patient presents with    Annual Wellness Visit       HPI:  Jenn Petersen is a 69 y.o. here for Medicare Annual Wellness Visit     Patient Active Problem List    Diagnosis Date Noted    10 year risk of MI or stroke 7.5% or greater 06/12/2024    Prediabetes 09/07/2023    History of colonic polyps 03/11/2014    Hyperlipidemia 12/15/2011    Hypothyroidism 04/09/2009       Current Outpatient Medications   Medication Sig Dispense Refill    benzonatate (TESSALON) 100 MG Cap Take 1 Capsule by mouth 3 times a day as needed for Cough. 60 Capsule 0    fluticasone (FLONASE) 50 MCG/ACT nasal spray Administer 1 Spray into affected nostril(S) every day. 16 g 0    neomycin-polymyxin-HC (PEDIOTIC HC) [id number] Suspension Administer 4 Drops into affected ear(s) 3 times a day. 10 mL 0    Oral Electrolytes (EMERGEN-C ELECTRO MIX PO) Take  by mouth.      levothyroxine (SYNTHROID) 88 MCG Tab Take 1 Tablet by mouth every morning on an empty stomach. 100 Tablet 3     No current facility-administered medications for this visit.          Current supplements as per medication list.     Allergies: Patient has no known allergies.    Current social contact/activities: rides bike, volunteer at food bank.      She  reports that she has never smoked. She has never used smokeless tobacco. She reports current alcohol use. She reports that she does not use drugs.  Counseling given: Not Answered      ROS:    Gait: Uses no assistive device  Ostomy: No  Other tubes: No  Amputations: No  Chronic oxygen use: No  Last eye exam: 4 years ago  Wears hearing aids: No   : Denies any urinary leakage during the last 6 months    Screening:  Depression Screening  Little interest or pleasure in doing things?  0 - not at all  Feeling down, depressed , or hopeless? 0 - not at all  Patient Health Questionnaire Score: 0     If depressive symptoms identified deferred to follow up visit unless specifically addressed in assessment and  plan.    Interpretation of PHQ-9 Total Score   Score Severity   1-4 No Depression   5-9 Mild Depression   10-14 Moderate Depression   15-19 Moderately Severe Depression   20-27 Severe Depression    Screening for Cognitive Impairment  Do you or any of your friends or family members have any concern about your memory? No  Three Minute Recall (Leader, Season, Table) 1/3    Jose Martin clock face with all 12 numbers and set the hands to show 10 minutes after 11.  Yes    Cognitive concerns identified deferred for follow up unless specifically addressed in assessment and plan.    Fall Risk Assessment  Has the patient had two or more falls in the last year or any fall with injury in the last year?  No    Safety Assessment  Do you always wear your seatbelt?  Yes  Any changes to home needed to function safely? No  Difficulty hearing.  No  Patient counseled about all safety risks that were identified.    Functional Assessment ADLs  Are there any barriers preventing you from cooking for yourself or meeting nutritional needs?  No.    Are there any barriers preventing you from driving safely or obtaining transportation?  No.    Are there any barriers preventing you from using a telephone or calling for help?  No    Are there any barriers preventing you from shopping?  No.    Are there any barriers preventing you from taking care of your own finances?  No    Are there any barriers preventing you from managing your medications?  No    Are there any barriers preventing you from showering, bathing or dressing yourself? No    Are there any barriers preventing you from doing housework or laundry? No  Are there any barriers preventing you from using the toilet?No  Are you currently engaging in any exercise or physical activity?  Yes. Rides bike    Self-Assessment of Health  What is your perception of your health? Good  Do you sleep more than six hours a night? Yes  In the past 7 days, how much did pain keep you from doing your normal work?  None  Do you spend quality time with family or friends (virtually or in person)? Yes  Do you usually eat a heart healthy diet that constists of a variety of fruits, vegetables, whole grains and fiber? Yes  Do you eat foods high in fat and/or Fast Food more than three times per week? No    Advance Care Planning  Do you have an Advance Directive, Living Will, Durable Power of , or POLST? No                 Health Maintenance Summary            Ordered - Colorectal Cancer Screening (View Topic Details) Ordered on 6/12/2024      No completion history exists for this topic.              Ordered - Mammogram (Every 2 Years) Ordered on 6/12/2024 03/28/2022  MA-SCREENING MAMMO BILAT W/TOMOSYNTHESIS W/CAD    02/19/2020  MA-SCREENING MAMMO BILAT W/TOMOSYNTHESIS W/CAD    07/08/2017  MA-SCREENING MAMMO BILAT W/TOMOSYNTHESIS W/CAD    02/10/2015  MA-DIAGNOSTIC MAMMO RIGHT W/TOMOSYNTHESIS W/CAD    02/02/2015  MA-SCREENING MAMMO BILAT W/TOMOSYNTHESIS W/CAD    Only the first 5 history entries have been loaded, but more history exists.              Postponed - COVID-19 Vaccine (5 - 2023-24 season) Postponed until 9/7/2024 03/07/2023  Imm Admin: MODERNA BIVALENT BOOSTER SARS-COV-2 VACCINE (6+)    07/18/2022  Imm Admin: MODERNA SARS-COV-2 VACCINE (12+)    11/22/2021  Imm Admin: MODERNA SARS-COV-2 VACCINE (12+)    04/06/2021  Imm Admin: MODERNA SARS-COV-2 VACCINE (12+)    03/09/2021  Imm Admin: MODERNA SARS-COV-2 VACCINE (12+)    Only the first 5 history entries have been loaded, but more history exists.              Postponed - Zoster (Shingles) Vaccines (2 of 2) Postponed until 11/12/2024 03/11/2022  Imm Admin: Zoster Vaccine Recombinant (RZV) (SHINGRIX)              Influenza Vaccine (Season Ended) Next due on 9/1/2024      10/09/2021  Imm Admin: Influenza Vaccine Quad Inj (Pf)    01/25/2016  Imm Admin: Influenza Vaccine Quad Inj (Pf)    10/17/2013  Imm Admin: Influenza, Unspecified - HISTORICAL DATA     09/20/2011  Imm Admin: Influenza Seasonal Injectable - Historical Data              Annual Wellness Visit (Yearly) Next due on 6/12/2025 06/12/2024  Level of Service: ANNUAL WELLNESS VISIT-INCLUDES PPPS SUBSEQUE*    07/10/2023  Prob Dx: Medicare annual wellness visit, initial    03/09/2022  Visit Dx: Medicare annual wellness visit, initial              Bone Density Scan (Every 5 Years) Next due on 3/28/2027      03/28/2022  DS-BONE DENSITY STUDY (DEXA)              IMM DTaP/Tdap/Td Vaccine (3 - Td or Tdap) Next due on 4/12/2032 04/12/2022  Imm Admin: Tdap Vaccine    10/15/2010  Imm Admin: Tdap Vaccine    05/10/2004  Imm Admin: TD Vaccine    01/01/1992  Imm Admin: TD Vaccine              Hepatitis C Screening  Completed      09/01/2023  Hepatitis C Antibody component of HEP C VIRUS ANTIBODY              Pneumococcal Vaccine: 65+ Years (Series Information) Completed      09/07/2023  Imm Admin: Pneumococcal Conjugate Vaccine (PCV20)    04/01/2022  Imm Admin: Pneumococcal polysaccharide vaccine (PPSV-23)              Hepatitis A Vaccine (Hep A) (Series Information) Aged Out      No completion history exists for this topic.              Hepatitis B Vaccine (Hep B) (Series Information) Aged Out      No completion history exists for this topic.              HPV Vaccines (Series Information) Aged Out      No completion history exists for this topic.              Polio Vaccine (Inactivated Polio) (Series Information) Aged Out      No completion history exists for this topic.              Meningococcal Immunization (Series Information) Aged Out      No completion history exists for this topic.                    Patient Care Team:  ERIN Rondon as PCP - General (Nurse Practitioner Family)        Social History     Tobacco Use    Smoking status: Never    Smokeless tobacco: Never   Vaping Use    Vaping status: Never Used   Substance Use Topics    Alcohol use: Yes     Comment: occasionally    Drug  "use: No     Family History   Problem Relation Age of Onset    Diabetes Mother         prediabetes    Heart Disease Mother     Cancer Father         bone cancer    Hypertension Sister     Blood Disease Brother         black vend anemia    Thyroid Brother     Breast Cancer Maternal Aunt     Cancer Maternal Grandmother         pancreatic    Ovarian Cancer Neg Hx     Tubal Cancer Neg Hx     Peritoneal Cancer Neg Hx     Colorectal Cancer Neg Hx     Hyperlipidemia Neg Hx     Stroke Neg Hx      She  has a past medical history of Medicare annual wellness visit, initial (7/26/2018), Risk for falls (3/9/2022), and Thyroid disease.   Past Surgical History:   Procedure Laterality Date    BREAST BIOPSY Right     cyst excision     Exam:   /70 (BP Location: Left arm, Patient Position: Sitting, BP Cuff Size: Adult)   Pulse 75   Temp 35.9 °C (96.6 °F) (Temporal)   Resp 20   Ht 1.6 m (5' 3\")   Wt 72.8 kg (160 lb 6.4 oz)   SpO2 98%  Body mass index is 28.41 kg/m².    Hearing good.    Dentition good  Alert, oriented in no acute distress.  Eye contact is good, speech goal directed, affect calm    Assessment and Plan. The following treatment and monitoring plan is recommended:    Problem List Items Addressed This Visit       Hypothyroidism     Chronic, stable. Continue levothyroxine 88 mcg daily.         Relevant Orders    TSH    Hyperlipidemia     Chronic, stable. Discussed healthy lifestyle recommendations.   The 10-year ASCVD risk score (Jack SMITH, et al., 2019) is: 7.7%  Plan to recheck 6-12 months         Relevant Orders    CT-CARDIAC SCORING    Lipid Profile    Prediabetes     Chronic, stable. Discussed healthy lifestyle recommendations.   Plan to recheck every 6-12 months         10 year risk of MI or stroke 7.5% or greater     Chronic, stable. Discussed healthy lifestyle recommendations.   Discussed CT cardiac score         Relevant Orders    CT-CARDIAC SCORING     Other Visit Diagnoses       Screening for colorectal " cancer        Relevant Orders    COLOGUARD (FIT DNA)    Encounter for screening mammogram for breast cancer        Relevant Orders    MA-SCREENING MAMMO BILAT W/TOMOSYNTHESIS W/CAD    Elevated hemoglobin A1c        Relevant Orders    HEMOGLOBIN A1C    Postmenopausal        Relevant Orders    VITAMIN D,25 HYDROXY (DEFICIENCY)    Vitamin D deficiency        Relevant Orders    VITAMIN D,25 HYDROXY (DEFICIENCY)    Screening for cardiovascular condition        Relevant Orders    Comp Metabolic Panel    Lipid Profile    Screening for deficiency anemia        Relevant Orders    CBC WITHOUT DIFFERENTIAL          Services suggested: No services needed at this time  Health Care Screening: Age-appropriate preventive services recommended by USPTF and ACIP covered by Medicare were discussed today. Services ordered if indicated and agreed upon by the patient.  Referrals offered: Community-based lifestyle interventions to reduce health risks and promote self-management and wellness, fall prevention, nutrition, physical activity, tobacco-use cessation, weight loss, and mental health services as per orders if indicated.    Discussion today about general wellness and lifestyle habits:    Prevent falls and reduce trip hazards; Cautioned about securing or removing rugs.  Have a working fire alarm and carbon monoxide detector;   Engage in regular physical activity and social activities     Follow-up: Return in about 3 months (around 9/12/2024) for Labs, wellness.

## 2024-06-25 ENCOUNTER — HOSPITAL ENCOUNTER (OUTPATIENT)
Dept: RADIOLOGY | Facility: MEDICAL CENTER | Age: 69
End: 2024-06-25
Payer: MEDICARE

## 2024-06-25 ENCOUNTER — HOSPITAL ENCOUNTER (OUTPATIENT)
Dept: RADIOLOGY | Facility: MEDICAL CENTER | Age: 69
End: 2024-06-25
Payer: COMMERCIAL

## 2024-06-25 DIAGNOSIS — E78.00 PURE HYPERCHOLESTEROLEMIA: ICD-10-CM

## 2024-06-25 DIAGNOSIS — Z12.31 ENCOUNTER FOR SCREENING MAMMOGRAM FOR BREAST CANCER: ICD-10-CM

## 2024-06-25 DIAGNOSIS — Z91.89 10 YEAR RISK OF MI OR STROKE 7.5% OR GREATER: ICD-10-CM

## 2024-06-25 PROCEDURE — 77063 BREAST TOMOSYNTHESIS BI: CPT

## 2024-06-25 PROCEDURE — 4410556 CT-CARDIAC SCORING (SELF PAY ONLY)

## 2024-06-28 ENCOUNTER — HOSPITAL ENCOUNTER (OUTPATIENT)
Dept: LAB | Facility: MEDICAL CENTER | Age: 69
End: 2024-06-28
Payer: MEDICARE

## 2024-07-17 ENCOUNTER — TELEPHONE (OUTPATIENT)
Dept: MEDICAL GROUP | Facility: PHYSICIAN GROUP | Age: 69
End: 2024-07-17
Payer: MEDICARE

## 2024-09-12 ENCOUNTER — HOSPITAL ENCOUNTER (OUTPATIENT)
Dept: LAB | Facility: MEDICAL CENTER | Age: 69
End: 2024-09-12
Payer: MEDICARE

## 2024-09-12 DIAGNOSIS — Z78.0 POSTMENOPAUSAL: ICD-10-CM

## 2024-09-12 DIAGNOSIS — Z13.0 SCREENING FOR DEFICIENCY ANEMIA: ICD-10-CM

## 2024-09-12 DIAGNOSIS — E55.9 VITAMIN D DEFICIENCY: ICD-10-CM

## 2024-09-12 DIAGNOSIS — E03.9 HYPOTHYROIDISM, UNSPECIFIED TYPE: ICD-10-CM

## 2024-09-12 DIAGNOSIS — R73.09 ELEVATED HEMOGLOBIN A1C: ICD-10-CM

## 2024-09-12 DIAGNOSIS — E78.00 PURE HYPERCHOLESTEROLEMIA: ICD-10-CM

## 2024-09-12 DIAGNOSIS — Z13.6 SCREENING FOR CARDIOVASCULAR CONDITION: ICD-10-CM

## 2024-09-12 LAB
25(OH)D3 SERPL-MCNC: 21 NG/ML (ref 30–100)
ALBUMIN SERPL BCP-MCNC: 4 G/DL (ref 3.2–4.9)
ALBUMIN/GLOB SERPL: 1.2 G/DL
ALP SERPL-CCNC: 92 U/L (ref 30–99)
ALT SERPL-CCNC: 33 U/L (ref 2–50)
ANION GAP SERPL CALC-SCNC: 12 MMOL/L (ref 7–16)
AST SERPL-CCNC: 36 U/L (ref 12–45)
BILIRUB SERPL-MCNC: 0.5 MG/DL (ref 0.1–1.5)
BUN SERPL-MCNC: 16 MG/DL (ref 8–22)
CALCIUM ALBUM COR SERPL-MCNC: 9 MG/DL (ref 8.5–10.5)
CALCIUM SERPL-MCNC: 9 MG/DL (ref 8.5–10.5)
CHLORIDE SERPL-SCNC: 105 MMOL/L (ref 96–112)
CHOLEST SERPL-MCNC: 240 MG/DL (ref 100–199)
CO2 SERPL-SCNC: 22 MMOL/L (ref 20–33)
CREAT SERPL-MCNC: 0.79 MG/DL (ref 0.5–1.4)
ERYTHROCYTE [DISTWIDTH] IN BLOOD BY AUTOMATED COUNT: 48 FL (ref 35.9–50)
EST. AVERAGE GLUCOSE BLD GHB EST-MCNC: 123 MG/DL
FASTING STATUS PATIENT QL REPORTED: NORMAL
GFR SERPLBLD CREATININE-BSD FMLA CKD-EPI: 81 ML/MIN/1.73 M 2
GLOBULIN SER CALC-MCNC: 3.3 G/DL (ref 1.9–3.5)
GLUCOSE SERPL-MCNC: 92 MG/DL (ref 65–99)
HBA1C MFR BLD: 5.9 % (ref 4–5.6)
HCT VFR BLD AUTO: 49.1 % (ref 37–47)
HDLC SERPL-MCNC: 51 MG/DL
HGB BLD-MCNC: 16.5 G/DL (ref 12–16)
LDLC SERPL CALC-MCNC: 156 MG/DL
MCH RBC QN AUTO: 31.4 PG (ref 27–33)
MCHC RBC AUTO-ENTMCNC: 33.6 G/DL (ref 32.2–35.5)
MCV RBC AUTO: 93.3 FL (ref 81.4–97.8)
PLATELET # BLD AUTO: 204 K/UL (ref 164–446)
PMV BLD AUTO: 11.5 FL (ref 9–12.9)
POTASSIUM SERPL-SCNC: 4.3 MMOL/L (ref 3.6–5.5)
PROT SERPL-MCNC: 7.3 G/DL (ref 6–8.2)
RBC # BLD AUTO: 5.26 M/UL (ref 4.2–5.4)
SODIUM SERPL-SCNC: 139 MMOL/L (ref 135–145)
TRIGL SERPL-MCNC: 167 MG/DL (ref 0–149)
TSH SERPL-ACNC: 1.54 UIU/ML (ref 0.35–5.5)
WBC # BLD AUTO: 5.5 K/UL (ref 4.8–10.8)

## 2024-09-12 PROCEDURE — 84443 ASSAY THYROID STIM HORMONE: CPT

## 2024-09-12 PROCEDURE — 83036 HEMOGLOBIN GLYCOSYLATED A1C: CPT | Mod: GA

## 2024-09-12 PROCEDURE — 80061 LIPID PANEL: CPT

## 2024-09-12 PROCEDURE — 36415 COLL VENOUS BLD VENIPUNCTURE: CPT | Mod: GA

## 2024-09-12 PROCEDURE — 80053 COMPREHEN METABOLIC PANEL: CPT

## 2024-09-12 PROCEDURE — 85027 COMPLETE CBC AUTOMATED: CPT

## 2024-09-12 PROCEDURE — 82306 VITAMIN D 25 HYDROXY: CPT

## 2024-09-18 ENCOUNTER — APPOINTMENT (OUTPATIENT)
Dept: MEDICAL GROUP | Facility: PHYSICIAN GROUP | Age: 69
End: 2024-09-18
Payer: MEDICARE

## 2024-09-18 VITALS
RESPIRATION RATE: 20 BRPM | OXYGEN SATURATION: 97 % | BODY MASS INDEX: 28.53 KG/M2 | HEIGHT: 63 IN | WEIGHT: 161 LBS | SYSTOLIC BLOOD PRESSURE: 110 MMHG | TEMPERATURE: 97.6 F | HEART RATE: 71 BPM | DIASTOLIC BLOOD PRESSURE: 82 MMHG

## 2024-09-18 DIAGNOSIS — Z91.89 10 YEAR RISK OF MI OR STROKE 7.5% OR GREATER: ICD-10-CM

## 2024-09-18 DIAGNOSIS — E03.9 ACQUIRED HYPOTHYROIDISM: ICD-10-CM

## 2024-09-18 DIAGNOSIS — E78.00 PURE HYPERCHOLESTEROLEMIA: ICD-10-CM

## 2024-09-18 DIAGNOSIS — R73.03 PREDIABETES: ICD-10-CM

## 2024-09-18 DIAGNOSIS — E03.9 HYPOTHYROIDISM, UNSPECIFIED TYPE: ICD-10-CM

## 2024-09-18 PROCEDURE — 99214 OFFICE O/P EST MOD 30 MIN: CPT

## 2024-09-18 PROCEDURE — 3079F DIAST BP 80-89 MM HG: CPT

## 2024-09-18 PROCEDURE — 3074F SYST BP LT 130 MM HG: CPT

## 2024-09-18 RX ORDER — ROSUVASTATIN CALCIUM 5 MG/1
5 TABLET, COATED ORAL EVERY EVENING
Qty: 100 TABLET | Refills: 3 | Status: SHIPPED | OUTPATIENT
Start: 2024-09-18

## 2024-09-18 RX ORDER — LEVOTHYROXINE SODIUM 88 UG/1
88 TABLET ORAL
Qty: 100 TABLET | Refills: 3 | Status: SHIPPED | OUTPATIENT
Start: 2024-09-18

## 2024-09-18 ASSESSMENT — FIBROSIS 4 INDEX: FIB4 SCORE: 2.09

## 2024-09-18 NOTE — PROGRESS NOTES
"Subjective:     CC: Diagnoses of 10 year risk of MI or stroke 7.5% or greater, Pure hypercholesterolemia, Hypothyroidism, unspecified type, Acquired hypothyroidism, and Prediabetes were pertinent to this visit.    Pt presents today for routine follow up and labs.    HPI:   Jenn presents today with    Problem   10 Year Risk of MI Or Stroke 7.5% Or Greater   Prediabetes   Hyperlipidemia   Hypothyroidism       Health Maintenance: Completed  Cancer screening:   Colorectal cancer screening: completed [facility] 2024  Cervical cancer screening: aged out  Breast cancer screening: due 6/25/26  Infectious disease screening/Immunizaitons  -STI screening: declines  Practices safe sex.    -Immunizaitons:   Influenza: recommend  Tetanus: up-to-date: 4/12/32 due  Anticipatory Guidance:  Elicits she is eating a variety of fresh veggies/fruits, variety of meats, cereal and carbs for breakfast  limited fast food/junk food/soda  Exercise: recommended 150 minutes/week; cardio/resistance at gym 45 min 5 days/week  Substance Abuse: denies  Safe in relationship. Yes/  Seat belts, bike helmet, gun safety discussed.  Sun protection used. Dental home established    ROS:  Review of Systems   All other systems reviewed and are negative.      Objective:     Exam:  /82 (BP Location: Right arm, Patient Position: Sitting, BP Cuff Size: Adult)   Pulse 71   Temp 36.4 °C (97.6 °F) (Temporal)   Resp 20   Ht 1.6 m (5' 3\")   Wt 73 kg (161 lb)   SpO2 97%   BMI 28.52 kg/m²  Body mass index is 28.52 kg/m².    Physical Exam  Vitals reviewed.   Constitutional:       General: She is not in acute distress.     Appearance: Normal appearance. She is well-groomed. She is not ill-appearing.   HENT:      Head: Normocephalic and atraumatic.      Right Ear: Tympanic membrane, ear canal and external ear normal.      Left Ear: Tympanic membrane, ear canal and external ear normal.      Nose: Nose normal.      Mouth/Throat:      Mouth: Mucous " membranes are moist.      Pharynx: Oropharynx is clear.   Eyes:      Extraocular Movements: Extraocular movements intact.      Conjunctiva/sclera: Conjunctivae normal.      Pupils: Pupils are equal, round, and reactive to light.   Neck:      Thyroid: No thyromegaly.      Trachea: Trachea normal.   Cardiovascular:      Rate and Rhythm: Normal rate and regular rhythm.      Pulses: Normal pulses.      Heart sounds: Normal heart sounds. No murmur heard.  Pulmonary:      Effort: Pulmonary effort is normal. No respiratory distress.      Breath sounds: Normal breath sounds. No wheezing.   Abdominal:      General: Bowel sounds are normal.   Musculoskeletal:         General: No swelling, tenderness or deformity. Normal range of motion.      Cervical back: Full passive range of motion without pain.   Lymphadenopathy:      Cervical: No cervical adenopathy.   Skin:     General: Skin is warm and dry.      Capillary Refill: Capillary refill takes less than 2 seconds.   Neurological:      General: No focal deficit present.      Mental Status: She is alert and oriented to person, place, and time. Mental status is at baseline.      Cranial Nerves: No cranial nerve deficit.      Sensory: No sensory deficit.      Motor: No weakness.   Psychiatric:         Mood and Affect: Mood normal.         Behavior: Behavior normal.         Labs:    Latest Reference Range & Units 06/25/24 16:29 06/25/24 16:38 09/12/24 06:42   WBC 4.8 - 10.8 K/uL   5.5   RBC 4.20 - 5.40 M/uL   5.26   Hemoglobin 12.0 - 16.0 g/dL   16.5 (H)   Hematocrit 37.0 - 47.0 %   49.1 (H)   MCV 81.4 - 97.8 fL   93.3   MCH 27.0 - 33.0 pg   31.4   MCHC 32.2 - 35.5 g/dL   33.6   RDW 35.9 - 50.0 fL   48.0   Platelet Count 164 - 446 K/uL   204   MPV 9.0 - 12.9 fL   11.5   Sodium 135 - 145 mmol/L   139   Potassium 3.6 - 5.5 mmol/L   4.3   Chloride 96 - 112 mmol/L   105   Co2 20 - 33 mmol/L   22   Anion Gap 7.0 - 16.0    12.0   Glucose 65 - 99 mg/dL   92   Bun 8 - 22 mg/dL   16    Creatinine 0.50 - 1.40 mg/dL   0.79   GFR (CKD-EPI) >60 mL/min/1.73 m 2   81   Calcium 8.5 - 10.5 mg/dL   9.0   Correct Calcium 8.5 - 10.5 mg/dL   9.0   AST(SGOT) 12 - 45 U/L   36   ALT(SGPT) 2 - 50 U/L   33   Alkaline Phosphatase 30 - 99 U/L   92   Total Bilirubin 0.1 - 1.5 mg/dL   0.5   Albumin 3.2 - 4.9 g/dL   4.0   Total Protein 6.0 - 8.2 g/dL   7.3   Globulin 1.9 - 3.5 g/dL   3.3   A-G Ratio g/dL   1.2   Glycohemoglobin 4.0 - 5.6 %   5.9 (H)   Estim. Avg Glu mg/dL   123   Fasting Status    Fasting   Cholesterol,Tot 100 - 199 mg/dL   240 (H)   Triglycerides 0 - 149 mg/dL   167 (H)   HDL >=40 mg/dL   51   LDL <100 mg/dL   156 (H)   25-Hydroxy   Vitamin D 25 30 - 100 ng/mL   21 (L)   TSH 0.350 - 5.500 uIU/mL   1.540   CT-CARDIAC SCORING (SELF PAY ONLY)   Rpt    MA-SCREENING MAMMO BILAT W/TOMOSYNTHESIS W/CAD  Rpt     (H): Data is abnormally high  (L): Data is abnormally low  Rpt: View report in Results Review for more information    Assessment & Plan:     69 y.o. female with the following -     Assessment & Plan  10 year risk of MI or stroke 7.5% or greater  The 10-year ASCVD risk score (Jack SMITH, et al., 2019) is: 6.3%  CTC score 22.9 6/25/24    Reports she is increasing exercise, discussed healthy lifestyle recommendations.   Will start rosuvastatin 5 mg daily    Orders:    rosuvastatin (CRESTOR) 5 MG Tab; Take 1 Tablet by mouth every evening.    Pure hypercholesterolemia  Chronic, unstable. Discussed healthy lifestyle recommendations.   The 10-year ASCVD risk score (Jack SMITH, et al., 2019) is: 6.3%  Will start rosuvastatin 5 mg daily    Orders:    rosuvastatin (CRESTOR) 5 MG Tab; Take 1 Tablet by mouth every evening.    Lipid Profile; Future    Hypothyroidism, unspecified type  Chronic, stable. Continue levothyroxine 88 mcg daily.         Acquired hypothyroidism  Chronic, stable. Continue levothyroxine 88 mcg daily.    Orders:    levothyroxine (SYNTHROID) 88 MCG Tab; Take 1 Tablet by mouth every morning on  an empty stomach.    Prediabetes  Chronic, unstable. Discussed healthy lifestyle recommendations.       Orders:    HEMOGLOBIN A1C; Future    Patient was educated in proper administration of medication(s) ordered today including safety, possible SE, risks, benefits, rationale and alternatives to therapy.   Supportive care, differential diagnoses, and indications for immediate follow-up discussed with patient.    Pathogenesis of diagnosis discussed including typical length and natural progression.    Instructed to return to clinic or nearest emergency department for any change in condition, further concerns, or worsening of symptoms.  Patient states understanding of the plan of care and discharge instructions.    Return in about 6 months (around 3/18/2025), or if symptoms worsen or fail to improve, for Labs.    Please note that this dictation was created using voice recognition software. I have made every reasonable attempt to correct obvious errors, but I expect that there are errors of grammar and possibly content that I did not discover before finalizing the note.

## 2024-09-18 NOTE — ASSESSMENT & PLAN NOTE
Chronic, unstable. Discussed healthy lifestyle recommendations.       Orders:    HEMOGLOBIN A1C; Future

## 2024-09-18 NOTE — ASSESSMENT & PLAN NOTE
The 10-year ASCVD risk score (Jack SMITH, et al., 2019) is: 6.3%  CTC score 22.9 6/25/24    Reports she is increasing exercise, discussed healthy lifestyle recommendations.   Will start rosuvastatin 5 mg daily    Orders:    rosuvastatin (CRESTOR) 5 MG Tab; Take 1 Tablet by mouth every evening.

## 2024-09-18 NOTE — ASSESSMENT & PLAN NOTE
Chronic, stable. Continue levothyroxine 88 mcg daily.    Orders:    levothyroxine (SYNTHROID) 88 MCG Tab; Take 1 Tablet by mouth every morning on an empty stomach.

## 2024-09-18 NOTE — ASSESSMENT & PLAN NOTE
Chronic, unstable. Discussed healthy lifestyle recommendations.   The 10-year ASCVD risk score (Jack SMITH, et al., 2019) is: 6.3%  Will start rosuvastatin 5 mg daily    Orders:    rosuvastatin (CRESTOR) 5 MG Tab; Take 1 Tablet by mouth every evening.    Lipid Profile; Future

## 2025-03-18 ENCOUNTER — HOSPITAL ENCOUNTER (OUTPATIENT)
Dept: LAB | Facility: MEDICAL CENTER | Age: 70
End: 2025-03-18
Payer: MEDICARE

## 2025-03-18 ENCOUNTER — OFFICE VISIT (OUTPATIENT)
Dept: MEDICAL GROUP | Facility: PHYSICIAN GROUP | Age: 70
End: 2025-03-18
Payer: MEDICARE

## 2025-03-18 VITALS
DIASTOLIC BLOOD PRESSURE: 62 MMHG | BODY MASS INDEX: 26.29 KG/M2 | WEIGHT: 154 LBS | HEART RATE: 66 BPM | TEMPERATURE: 97.5 F | SYSTOLIC BLOOD PRESSURE: 116 MMHG | OXYGEN SATURATION: 98 % | HEIGHT: 64 IN | RESPIRATION RATE: 20 BRPM

## 2025-03-18 DIAGNOSIS — R73.03 PREDIABETES: ICD-10-CM

## 2025-03-18 DIAGNOSIS — Z12.31 ENCOUNTER FOR SCREENING MAMMOGRAM FOR MALIGNANT NEOPLASM OF BREAST: ICD-10-CM

## 2025-03-18 DIAGNOSIS — E78.00 PURE HYPERCHOLESTEROLEMIA: ICD-10-CM

## 2025-03-18 DIAGNOSIS — E03.9 HYPOTHYROIDISM, UNSPECIFIED TYPE: ICD-10-CM

## 2025-03-18 DIAGNOSIS — Z91.89 10 YEAR RISK OF MI OR STROKE 7.5% OR GREATER: ICD-10-CM

## 2025-03-18 LAB
CHOLEST SERPL-MCNC: 176 MG/DL (ref 100–199)
EST. AVERAGE GLUCOSE BLD GHB EST-MCNC: 117 MG/DL
HBA1C MFR BLD: 5.7 % (ref 4–5.6)
HDLC SERPL-MCNC: 54 MG/DL
LDLC SERPL CALC-MCNC: 95 MG/DL
TRIGL SERPL-MCNC: 134 MG/DL (ref 0–149)

## 2025-03-18 PROCEDURE — 36415 COLL VENOUS BLD VENIPUNCTURE: CPT | Mod: GA

## 2025-03-18 PROCEDURE — 3074F SYST BP LT 130 MM HG: CPT

## 2025-03-18 PROCEDURE — 3078F DIAST BP <80 MM HG: CPT

## 2025-03-18 PROCEDURE — 83036 HEMOGLOBIN GLYCOSYLATED A1C: CPT | Mod: GA

## 2025-03-18 PROCEDURE — 80061 LIPID PANEL: CPT

## 2025-03-18 PROCEDURE — 99214 OFFICE O/P EST MOD 30 MIN: CPT

## 2025-03-18 ASSESSMENT — PATIENT HEALTH QUESTIONNAIRE - PHQ9: CLINICAL INTERPRETATION OF PHQ2 SCORE: 0

## 2025-03-18 ASSESSMENT — ENCOUNTER SYMPTOMS: DIZZINESS: 1

## 2025-03-18 ASSESSMENT — FIBROSIS 4 INDEX: FIB4 SCORE: 2.12

## 2025-03-18 NOTE — ASSESSMENT & PLAN NOTE
Chronic, unstable. Discussed healthy lifestyle recommendations.   Repeat labs.  Continue rosuvastatin 5 mg nightly.

## 2025-03-18 NOTE — PROGRESS NOTES
Verbal consent was acquired by the patient to use [employer] ambient listening note generation during this visit     Subjective:     CC: Diagnoses of Pure hypercholesterolemia, Prediabetes, Hypothyroidism, unspecified type, Encounter for screening mammogram for malignant neoplasm of breast, and 10 year risk of MI or stroke 7.5% or greater were pertinent to this visit.    HPI:   Jenn presents today with    History of Present Illness  The patient is a 70-year-old female who presents for evaluation of vitamin D deficiency, weight management, vertigo, and breast discomfort.    She has been experiencing recurrent episodes of vertigo, which she attributes to rapid positional changes. She recalls a previous episode where she initially suspected an ocular issue but later realized it was related to her ears. This was confirmed by the presence of a cyst in her ear, which was subsequently removed. She has been advised to avoid water exposure to her ears and uses eardrops and nasal spray as part of her management strategy. She also takes cough medication when her sinuses are congested. She believes her current symptoms are due to cerumen impaction, as she experienced dizziness a week ago and had her ears cleaned. She has sought care from two different physicians for this issue.    She reports a sensation of pulling in her breasts, which she attributes to their size and sagging. She does not experience any associated pain. Her last mammogram was conducted in 2024. She has a history of a right breast cyst, which was excised during her 10th grade.    She has not been taking vitamin D supplements. She has been making efforts to lose weight through walking, gym workouts, and improved dietary habits. Her diet includes eggs, rice, bread, potatoes, berries, Greek yogurt with strawberries and blueberries, honey, and granola. She avoids starchy foods and consumes peanut butter and apples. She has received one dose of the shingles  "vaccine and plans to discuss the second dose during her next pharmacy visit.    FAMILY HISTORY  Her sister has low bone density and takes vitamin D supplements. Her brother  at 29 of rare blood disease. Her sister  as an infant.    IMMUNIZATIONS  She has received one shingle shot.    Current Outpatient Medications   Medication Sig Dispense Refill    rosuvastatin (CRESTOR) 5 MG Tab Take 1 Tablet by mouth every evening. 100 Tablet 3    levothyroxine (SYNTHROID) 88 MCG Tab Take 1 Tablet by mouth every morning on an empty stomach. 100 Tablet 3     No current facility-administered medications for this visit.       Problem   10 Year Risk of MI Or Stroke 7.5% Or Greater   Prediabetes   Hyperlipidemia   Hypothyroidism       ROS:  Review of Systems   Skin:         Left breast, sensation of skin pulling   Neurological:  Positive for dizziness (vertigo intermittently).   All other systems reviewed and are negative.      Objective:     Exam:  /62 (BP Location: Right arm, Patient Position: Sitting, BP Cuff Size: Adult)   Pulse 66   Temp 36.4 °C (97.5 °F) (Temporal)   Resp 20   Ht 1.626 m (5' 4\")   Wt 69.9 kg (154 lb)   SpO2 98%   BMI 26.43 kg/m²  Body mass index is 26.43 kg/m².    Physical Exam  Vitals reviewed.   Constitutional:       General: She is not in acute distress.     Appearance: Normal appearance. She is not ill-appearing.   HENT:      Head: Normocephalic and atraumatic.      Right Ear: Tympanic membrane, ear canal and external ear normal.      Left Ear: Tympanic membrane, ear canal and external ear normal.   Cardiovascular:      Rate and Rhythm: Normal rate and regular rhythm.      Pulses: Normal pulses.      Heart sounds: Normal heart sounds.   Pulmonary:      Effort: Pulmonary effort is normal. No respiratory distress.      Breath sounds: Normal breath sounds.   Abdominal:      General: Bowel sounds are normal.      Palpations: Abdomen is soft.   Skin:     General: Skin is warm and dry.      " Findings: No rash.   Neurological:      General: No focal deficit present.      Mental Status: She is alert and oriented to person, place, and time.   Psychiatric:         Mood and Affect: Mood normal.         Behavior: Behavior normal.         Assessment & Plan:     70 y.o. female with the following -     Assessment & Plan  1. Vitamin D deficiency.  Her vitamin D levels were previously noted to be low at 21 ng/mL (normal range:  ng/mL). She is advised to start taking vitamin D supplements, specifically D3 with K2, to help with absorption. The recommended dosage is 800-1000 IU daily.    2. Prediabetes.  Her last A1c indicated a prediabetic range. She is advised to continue her current lifestyle modifications, including regular exercise and a balanced diet. A repeat A1c test will be conducted today to monitor her progress.    3. Hypercholesterolemia.  Her cholesterol levels were previously elevated. She is advised to continue her current lifestyle modifications, including regular exercise and a balanced diet. A repeat cholesterol test will be conducted today to monitor her progress.    4. Weight management.  She has successfully lost 10 pounds since last year, now weighing 154 pounds. She is encouraged to continue her current regimen of walking, exercising, and maintaining a healthy diet.    5. Health maintenance.  She has received one shingles shot and is advised to get the second one. Her last mammogram was done a year ago, and a new order has been placed for her next mammogram in June. Her thyroid function will be reassessed closer to September 2025.    6. Vertigo.  She reports experiencing vertigo again, which she attributes to wax buildup in her ears. She uses eardrops and nose spray to manage her symptoms. No wax buildup was noted during the examination.    7. Breast discomfort.  She reports a pulling sensation in her breast, which she attributes to gravity and the size of her breasts. No pain or  significant changes in breast tissue were noted. Her last mammogram was done a year ago, and a new order has been placed for her next mammogram in June.    Follow-up  The patient will follow up in 6 months or sooner if necessary.    PROCEDURE  The patient had a cyst excised from her right breast during her 10th grade.  She also had a cyst removed from her ear in the past.    Problem List Items Addressed This Visit       Hypothyroidism    Chronic, stable. Continue levothyroxine 88 mcg daily         Hyperlipidemia    Chronic, unstable. Discussed healthy lifestyle recommendations.   Repeat labs.  Continue rosuvastatin 5 mg nightly.         Prediabetes    Chronic, unstable. Discussed healthy lifestyle recommendations.   Repeat labs.            10 year risk of MI or stroke 7.5% or greater    Chronic, stable. Continue healthy lifestyle recommendations. Continue rosuvastatin 5 mg daily.  CTC score 22.9 6/25/24          Other Visit Diagnoses         Encounter for screening mammogram for malignant neoplasm of breast        Relevant Orders    MA-SCREENING MAMMO BILAT W/TOMOSYNTHESIS W/CAD          Patient was educated in proper administration of medication(s) ordered today including safety, possible SE, risks, benefits, rationale and alternatives to therapy.   Supportive care, differential diagnoses, and indications for immediate follow-up discussed with patient.    Pathogenesis of diagnosis discussed including typical length and natural progression.    Instructed to return to clinic or nearest emergency department for any change in condition, further concerns, or worsening of symptoms.  Patient states understanding of the plan of care and discharge instructions.    Return in about 6 months (around 9/18/2025), or if symptoms worsen or fail to improve, for Labs.    Please note that this dictation was created using voice recognition software. I have made every reasonable attempt to correct obvious errors, but I expect that  there are errors of grammar and possibly content that I did not discover before finalizing the note.

## 2025-03-18 NOTE — ASSESSMENT & PLAN NOTE
Chronic, stable. Continue healthy lifestyle recommendations. Continue rosuvastatin 5 mg daily.  CTC score 22.9 6/25/24

## 2025-03-19 ENCOUNTER — RESULTS FOLLOW-UP (OUTPATIENT)
Dept: MEDICAL GROUP | Facility: PHYSICIAN GROUP | Age: 70
End: 2025-03-19

## 2025-08-22 DIAGNOSIS — E03.9 HYPOTHYROIDISM, UNSPECIFIED TYPE: Primary | ICD-10-CM

## 2025-08-22 DIAGNOSIS — R73.03 PREDIABETES: ICD-10-CM

## 2025-08-22 DIAGNOSIS — E55.9 VITAMIN D DEFICIENCY: ICD-10-CM
